# Patient Record
Sex: FEMALE | Race: WHITE | Employment: OTHER | URBAN - METROPOLITAN AREA
[De-identification: names, ages, dates, MRNs, and addresses within clinical notes are randomized per-mention and may not be internally consistent; named-entity substitution may affect disease eponyms.]

---

## 2017-10-19 ENCOUNTER — HOSPITAL ENCOUNTER (OUTPATIENT)
Dept: MAMMOGRAPHY | Age: 67
Discharge: HOME | End: 2017-10-19
Attending: INTERNAL MEDICINE | Admitting: INTERNAL MEDICINE

## 2017-10-19 DIAGNOSIS — Z12.31 VISIT FOR SCREENING MAMMOGRAM: ICD-10-CM

## 2018-10-27 ENCOUNTER — HOSPITAL ENCOUNTER (OUTPATIENT)
Dept: WOMENS IMAGING | Age: 68
Discharge: HOME | End: 2018-10-27
Payer: MEDICARE

## 2018-10-27 DIAGNOSIS — Z12.31 ENCOUNTER FOR SCREENING MAMMOGRAM FOR BREAST CANCER: ICD-10-CM

## 2018-10-27 PROCEDURE — 77063 BREAST TOMOSYNTHESIS BI: CPT

## 2019-10-28 ENCOUNTER — HOSPITAL ENCOUNTER (OUTPATIENT)
Dept: WOMENS IMAGING | Age: 69
Discharge: HOME | End: 2019-10-28
Payer: MEDICARE

## 2019-10-28 DIAGNOSIS — Z12.31 ENCOUNTER FOR SCREENING MAMMOGRAM FOR MALIGNANT NEOPLASM OF BREAST: ICD-10-CM

## 2019-10-28 PROCEDURE — 77063 BREAST TOMOSYNTHESIS BI: CPT

## 2020-11-02 ENCOUNTER — HOSPITAL ENCOUNTER (OUTPATIENT)
Dept: WOMENS IMAGING | Age: 70
Discharge: HOME | End: 2020-11-02
Payer: MEDICARE

## 2020-11-02 PROCEDURE — 77063 BREAST TOMOSYNTHESIS BI: CPT

## 2021-11-03 ENCOUNTER — HOSPITAL ENCOUNTER (OUTPATIENT)
Dept: WOMENS IMAGING | Age: 71
Discharge: HOME | End: 2021-11-03
Payer: MEDICARE

## 2021-11-03 DIAGNOSIS — Z12.31 ENCOUNTER FOR SCREENING MAMMOGRAM FOR MALIGNANT NEOPLASM OF BREAST: ICD-10-CM

## 2021-11-03 PROCEDURE — 77063 BREAST TOMOSYNTHESIS BI: CPT

## 2022-02-16 ENCOUNTER — APPOINTMENT (OUTPATIENT)
Dept: GENERAL RADIOLOGY | Age: 72
End: 2022-02-16
Payer: MEDICARE

## 2022-02-16 ENCOUNTER — HOSPITAL ENCOUNTER (EMERGENCY)
Age: 72
Discharge: HOME | End: 2022-02-16
Payer: MEDICARE

## 2022-02-16 VITALS
BODY MASS INDEX: 24.55 KG/M2 | RESPIRATION RATE: 16 BRPM | DIASTOLIC BLOOD PRESSURE: 87 MMHG | SYSTOLIC BLOOD PRESSURE: 144 MMHG | WEIGHT: 130 LBS | HEIGHT: 61 IN | HEART RATE: 86 BPM | TEMPERATURE: 97.8 F | OXYGEN SATURATION: 96 %
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
## 2022-02-16 DIAGNOSIS — S52.571A OTHER CLOSED INTRA-ARTICULAR FRACTURE OF DISTAL END OF RIGHT RADIUS, INITIAL ENCOUNTER: Primary | ICD-10-CM

## 2022-02-16 PROCEDURE — 29125 APPL SHORT ARM SPLINT STATIC: CPT

## 2022-02-16 PROCEDURE — 73100 X-RAY EXAM OF WRIST: CPT

## 2022-02-16 PROCEDURE — 73110 X-RAY EXAM OF WRIST: CPT

## 2022-02-16 PROCEDURE — 99283 EMERGENCY DEPT VISIT LOW MDM: CPT

## 2022-02-16 RX ORDER — PRAVASTATIN SODIUM 20 MG
20 TABLET ORAL NIGHTLY
COMMUNITY
Start: 2021-08-31

## 2022-02-16 RX ORDER — OXYCODONE HYDROCHLORIDE 5 MG/1
5 TABLET ORAL EVERY 8 HOURS PRN
Qty: 12 TABLET | Refills: 0 | Status: SHIPPED | OUTPATIENT
Start: 2022-02-16 | End: 2022-02-20

## 2022-02-16 ASSESSMENT — PAIN SCALES - GENERAL
PAINLEVEL_OUTOF10: 6
PAINLEVEL_OUTOF10: 5

## 2022-02-16 ASSESSMENT — PAIN DESCRIPTION - LOCATION: LOCATION: WRIST

## 2022-02-16 ASSESSMENT — PAIN DESCRIPTION - PAIN TYPE: TYPE: ACUTE PAIN

## 2022-02-16 ASSESSMENT — PAIN DESCRIPTION - ORIENTATION: ORIENTATION: RIGHT

## 2022-02-16 ASSESSMENT — PAIN - FUNCTIONAL ASSESSMENT: PAIN_FUNCTIONAL_ASSESSMENT: 0-10

## 2022-02-17 NOTE — ED NOTES
Applied an orthoglass ocl sugar tong splint to the patient's injured right wrist with the assistance of Miguelina. Applied a layer of stockinette and webrils underneath for added padding and patient comfort. Constructed splint using 27\" of 3\" orthoglass. Secured splint in a position of function using two 2\" ACE wraps and one 3\" ACE wrap. Checked CMS before and after application; remained intact. [facility]aced affected appendage [facility]. Patient understands all care instructions/directions and had no additional questions or concerns.      Jake Chance  02/16/22 [id number]

## 2022-02-17 NOTE — ED PROVIDER NOTES
Worried About [facility] in the Last Year: Not on file    Aidee of Food in the Last Year: Not on file   Transportation Needs:     Lack of Transportation (Medical): Not on file    Lack of Transportation (Non-Medical): Not on file   Physical Activity:     Days of Exercise per Week: Not on file    Minutes of Exercise per Session: Not on file   Stress:     Feeling of Stress : Not on file   Social Connections:     Frequency of Communication with Friends and Family: Not on file    Frequency of Social Gatherings with Friends and Family: Not on file    Attends Restorationist Services: Not on file    Active Member of [address] [place] [address] or Organizations: Not on file    Attends Club or Organization Meetings: Not on file    Marital Status: Not on file   Intimate Partner Violence:     Fear of Current or Ex-Partner: Not on file    Emotionally Abused: Not on file    Physically Abused: Not on file    Sexually Abused: Not on file   Housing Stability:     Unable to Pay for Housing in the Last Year: Not on file    Number of [place] in the Last Year: Not on file    Unstable Housing in the Last Year: Not on file     No current facility-administered medications for this encounter. Current Outpatient Medications   Medication Sig Dispense Refill    pravastatin (PRAVACHOL) 20 MG tablet Take 20 mg by mouth nightly      alendronate (FOSAMAX) 70 MG tablet Take 70 mg by mouth every 7 days      levothyroxine (SYNTHROID) 25 MCG tablet Take 25 mcg by mouth Daily      calcium citrate-vitamin D (CITRICAL + D) 315-250 MG-UNIT TABS per tablet Take 1 tablet by mouth 2 times daily (with meals)       No Known Allergies    REVIEW OF SYSTEMS  6 systems reviewed, pertinent positives per HPI otherwise noted to be negative    PHYSICAL EXAM  BP (!) 153/109   Pulse 85   Temp 97.8 °F (36.6 °C) (Oral)   Resp 18   Ht 5' 1\" (1.549 m)   Wt 130 lb (59 kg)   SpO2 95%   BMI 24.56 kg/m²   GENERAL APPEARANCE: Awake and alert. Cooperative. No acute distress. HEAD: Normocephalic. Atraumatic. EYES: PERRL. EOM's grossly intact. ENT: Mucous membranes are moist.   NECK: Supple. Normal ROM. CHEST: Equal symmetric chest rise. LUNGS: Breathing is unlabored. Speaking comfortably in full sentences. Abdomen: Nondistended  EXTREMITIES: MAEE. No acute deformities. Right wrist with obvious deformity. Radial pulses in tact and cap refills less than 2 seconds. Sensation is grossly intact. There is palpable step-off of distal radius. No crepitus noted. Significant swelling and bruising noted. She has full active range of motion of digits but very limited range of motion of wrist.  No elbow injury. SKIN: Warm and dry. NEUROLOGICAL: Alert and oriented. Strength is 5/5 in all extremities and sensation is intact. RADIOLOGY  XR WRIST RIGHT (2 VIEWS)    Result Date: 2/16/2022  EXAMINATION: 2 XRAY VIEWS OF THE RIGHT WRIST 2/16/2022 6:42 pm COMPARISON: None. HISTORY: ORDERING SYSTEM PROVIDED HISTORY: fall TECHNOLOGIST PROVIDED HISTORY: Reason for exam:->fall Reason for Exam: fall, wrist pain FINDINGS: Acute impacted volar angulated minimally displaced intra-articular fracture of the distal radial metaphysis. Ulnar styloid fracture of uncertain chronicity. Suboptimal evaluation for dislocation due to positioning. Moderate to large soft tissue edema. Acute distal radial fracture as described. ED COURSE  Patient was seen and evaluated in the emergency department for a wrist injury. There is an obvious deformity on exam, she is neurovascularly intact. Initial x-ray shows acute impacted volar angulated minimally displaced intra-articular fracture of distal radius metaphysis with ulnar styloid fracture. A hematoma block was performed by myself and the patient was placed in finger traps.   I consulted with orthopedics and spoke with Dr. Danielle Avelar who recommended follow-up in clinic early next week we did probable surgery tentatively on Thursday. I did attempt to manipulate the patient's wrist while a sugar tong splint was placed. We did obtain repeat x-ray which showed improved alignment. A discussion was had with the patient regarding imaging results and follow-up. She will call the orthopedic office tomorrow for close follow-up next week with possible plan for surgery on Thursday. We also discussed taking ibuprofen and Tylenol for pain on drive a short course of for breakthrough pain. Risk management discussed and shared decision making had with patient and/or surrogate. All questions were answered. Patient will follow up with Ortho for further evaluation/treatment. Patient will return to ED for new/worsening symptoms. MDM    I estimate there is LOW risk for COMPARTMENT SYNDROME, DEEP VENOUS THROMBOSIS, SEPTIC ARTHRITIS, TENDON OR NEUROVASCULAR INJURY, thus I consider the discharge disposition reasonable. Mali Encarnacion and I have discussed the diagnosis and risks, and we agree with discharging home to follow-up with their primary doctor or the referral orthopedist. We also discussed returning to the Emergency Department immediately if new or worsening symptoms occur. We have discussed the symptoms which are most concerning (e.g., changing or worsening pain, numbness, weakness) that necessitate immediate return. Final Impression    1. Other closed intra-articular fracture of distal end of right radius, initial encounter    2. Fall, initial encounter        Blood pressure (!) 144/87, pulse 86, temperature 97.8 °F (36.6 °C), temperature source Oral, resp. rate 16, height 5' 1\" (1.549 m), weight 130 lb (59 kg), SpO2 96 %. DISPOSITION  Patient was discharged to home in good condition.             Santos Barrientos, [address]  02/18/22 4900

## 2022-02-17 NOTE — ED NOTES
Pt scripts x1 instructed to follow up with [facility]. Assessed per [place] PA.      Felipe Cortés LPN  04/85/21 2456

## 2022-02-17 NOTE — ED NOTES
I called pt daughter in law made her aware pt is up for d/c home/will be out in car approx 10min.      Enrique Thompson LPN  29/32/59 3715

## 2022-02-18 ENCOUNTER — TELEPHONE (OUTPATIENT)
Dept: ORTHOPEDIC SURGERY | Age: 72
End: 2022-02-18

## 2022-02-18 ENCOUNTER — OFFICE VISIT (OUTPATIENT)
Dept: ORTHOPEDIC SURGERY | Age: 72
End: 2022-02-18
Payer: MEDICARE

## 2022-02-18 VITALS — HEIGHT: 61 IN | BODY MASS INDEX: 24.55 KG/M2 | WEIGHT: 130 LBS

## 2022-02-18 DIAGNOSIS — S52.501A CLOSED FRACTURE OF DISTAL END OF RIGHT RADIUS, UNSPECIFIED FRACTURE MORPHOLOGY, INITIAL ENCOUNTER: Primary | ICD-10-CM

## 2022-02-18 DIAGNOSIS — M25.531 RIGHT WRIST PAIN: ICD-10-CM

## 2022-02-18 PROCEDURE — 3017F COLORECTAL CA SCREEN DOC REV: CPT | Performed by: ORTHOPAEDIC SURGERY

## 2022-02-18 PROCEDURE — G8420 CALC BMI NORM PARAMETERS: HCPCS | Performed by: ORTHOPAEDIC SURGERY

## 2022-02-18 PROCEDURE — 1090F PRES/ABSN URINE INCON ASSESS: CPT | Performed by: ORTHOPAEDIC SURGERY

## 2022-02-18 PROCEDURE — 1123F ACP DISCUSS/DSCN MKR DOCD: CPT | Performed by: ORTHOPAEDIC SURGERY

## 2022-02-18 PROCEDURE — 99204 OFFICE O/P NEW MOD 45 MIN: CPT | Performed by: ORTHOPAEDIC SURGERY

## 2022-02-18 PROCEDURE — 1036F TOBACCO NON-USER: CPT | Performed by: ORTHOPAEDIC SURGERY

## 2022-02-18 PROCEDURE — G8400 PT W/DXA NO RESULTS DOC: HCPCS | Performed by: ORTHOPAEDIC SURGERY

## 2022-02-18 PROCEDURE — 4040F PNEUMOC VAC/ADMIN/RCVD: CPT | Performed by: ORTHOPAEDIC SURGERY

## 2022-02-18 PROCEDURE — G8427 DOCREV CUR MEDS BY ELIG CLIN: HCPCS | Performed by: ORTHOPAEDIC SURGERY

## 2022-02-18 PROCEDURE — G8484 FLU IMMUNIZE NO ADMIN: HCPCS | Performed by: ORTHOPAEDIC SURGERY

## 2022-02-18 NOTE — LETTER
[facility]  Surgery Precert & Billing Form:    DEMOGRAPHICS:                                                                                                       Patient Name:  Dallin Mccullough  Patient :  1950  Patient SS#:     Patient Phone: [phone] (home)  Alt.  Patient Phone:    Patient Address:  [address]  PCP:  Dee Whitley MD  Insurance: MEDICARE PART A AND B    DIAGNOSIS & PROCEDURE:                                                                                      Diagnosis: CLOSED FRACTURE OF RIGHT DISTAL RADIUS - S52.501A  Operation: right    SURGERY  INFORMATION  Date of Surgery:   22  Location:    [facility]  Type:    Outpatient  23 hour hold:  No  Surgeon:        Nic Cordoba MD          BILLING INFORMATION:                                                                                                Physician Procedure                                            CPT Codes          ORIF RIGHT DISTAL RADIUS FRACTURE- 86964                Precert information:    ***

## 2022-02-18 NOTE — H&P (VIEW-ONLY)
ORTHOPAEDIC SURGERY HISTORY AND PHYSICAL NOTE  Chief Complaint   Patient presents with    New Patient     R wrist       HISTORY OF PRESENT ILLNESS:  78-year-old right-hand-dominant female presents for evaluation of a right wrist injury. She sustained a mechanical fall on 216 2022. She indicates that she was walking her dog. Her dog pulled aggressively and she lost her balance. She fell onto an outstretched right hand. She sustained a displaced intra-articular dorsally angulated distal radius fracture. She presented emergency department at that time. A closed reduction was performed with the use of finger traps. She is placed into a splint. Since that time, she has continued to have pain and swelling to her wrist.  She denies dysesthesias or numbness. She did not have any open wounds. Her pain is currently rated a 6 out of 10. Its localized to the wrist and does not radiate. She denies any elbow pain. She denies other injuries. She denies syncope. Past Medical History:   Diagnosis Date    Thyroid disease        Current Outpatient Medications   Medication Sig Dispense Refill    pravastatin (PRAVACHOL) 20 MG tablet Take 20 mg by mouth nightly      oxyCODONE (ROXICODONE) 5 MG immediate release tablet Take 1 tablet by mouth every 8 hours as needed for Pain for up to 4 days. Intended supply: 3 days. Take lowest dose possible to manage pain 12 tablet 0    alendronate (FOSAMAX) 70 MG tablet Take 70 mg by mouth every 7 days      levothyroxine (SYNTHROID) 25 MCG tablet Take 25 mcg by mouth Daily      calcium citrate-vitamin D (CITRICAL + D) 315-250 MG-UNIT TABS per tablet Take 1 tablet by mouth 2 times daily (with meals)       No current facility-administered medications for this visit.         Past Surgical History:   Procedure Laterality Date    ABDOMEN SURGERY  1995    benign tmor    COLONOSCOPY  12-    CSF SHUNT  1985    arnold chiari malformation - left ear       No Known Allergies    Family History   Problem Relation Age of Onset    Heart Disease Father     Breast Cancer Sister 46       Social History     Socioeconomic History    Marital status:      Spouse name: Not on file    Number of children: Not on file    Years of education: Not on file    Highest education level: Not on file   Occupational History    Not on file   Tobacco Use    Smoking status: Never Smoker    Smokeless tobacco: Never Used   Substance and Sexual Activity    Alcohol use: No    Drug use: Not on file    Sexual activity: Not on file   Other Topics Concern    Not on file   Social History Narrative    Not on file     Social Determinants of Health     Financial Resource Strain:     Difficulty of Paying Living Expenses: Not on file   Food Insecurity:     Worried About Running Out of Food in the Last Year: Not on file    Aidee of Food in the Last Year: Not on file   Transportation Needs:     Lack of Transportation (Medical): Not on file    Lack of Transportation (Non-Medical):  Not on file   Physical Activity:     Days of Exercise per Week: Not on file    Minutes of Exercise per Session: Not on file   Stress:     Feeling of Stress : Not on file   Social Connections:     Frequency of Communication with Friends and Family: Not on file    Frequency of Social Gatherings with Friends and Family: Not on file    Attends Taoist Services: Not on file    Active Member of [address] [place] [address] [employer] or Organizations: Not on file    Attends Club or Organization Meetings: Not on file    Marital Status: Not on file   Intimate Partner Violence:     Fear of Current or Ex-Partner: Not on file    Emotionally Abused: Not on file    Physically Abused: Not on file    Sexually Abused: Not on file   Housing Stability:     Unable to Pay for Housing in the Last Year: Not on file    Number of [place] in the Last Year: Not on file    Unstable Housing in the Last Year: Not on file     Review of Systems: Please see today's intake form for complete review of systems    PHYSICAL EXAM  Gen: awake, alert, oriented  HEENT: atraumatic, normocephalic  Neck: supple  Resp: equal chest rise bilaterally, no audible wheezes, no accessory muscle use. CV: Lower extremities warm and well perfused. Abd: soft, nontender   Focused examination of the right upper extremity:  Patient presents in a well-padded sugar tong splint. This was left in place. There is no obvious deformity. Sensation is intact to light touch in median, radial, ulnar, and axillary distributions. Motor function is intact to AIN, PIN, ulnar motor nerves. There is a strong palpable radial pulse, and brisk capillary refill to the fingers. Compartments are soft and compressible    RADIOGRAPHIC EXAM:  2 views of the right wrist including PA and lateral x-rays obtained in the emergency department demonstrate approximately 35 degrees dorsal angulation And loss of radial height. There is an associated ulnar styloid fracture. There is dorsal comminution. No other fractures are seen. Postreduction x-rays in the emergency department demonstrate persistent dorsal angulation and minor loss of radial height. There is overall improvement in alignment. There is intra-articular fracture extension into the dorsal lunate facet. There is minor radial translation in relation to the radial shaft. Repeat x-rays in clinic today including 3 views, PA, oblique, and lateral redemonstrate intra-articular distal radius fracture with persistent dorsal angulation. There is minor loss of radial height. There is an associated ulnar styloid fracture. There is dorsal comminution. ASSESSEMENT AND PLAN    80 y.o. female with the following orthopaedic surgery problems:    1. Right displaced intra-articular distal radius fracture    Patient has numerous predictors of failure of nonoperative management.   She has an age greater than 61, associated ulnar styloid fracture, initial dorsal angulation greater than 30 degrees, and dorsal comminution. I recommended operative intervention in the form of volar locked plating. I discussed the procedure in detail including risks, benefits, and alternatives. We described a standard postoperative course. She expressed understanding of this. She will undergo a preoperative work-up including EKG and labs. I recommended that she see her primary care physician for history of physical.  Would tentatively plan ORIF Thursday, 2/24/2022 at [facility] ambulatory surgery center on an outpatient basis. She will undergo general anesthesia with an upper extremity block for postoperative pain control.     Kike Flores MD

## 2022-02-18 NOTE — TELEPHONE ENCOUNTER
Auth: NPR  Date: 02/24/22  Type of SX: OUTPATIENT  Location: [facility]  CPT: 02116   DX: S52.501A  SX area: R DISTAL RADIUS  Insurance: MEDICARE A&B

## 2022-02-18 NOTE — H&P
ORTHOPAEDIC SURGERY HISTORY AND PHYSICAL NOTE  Chief Complaint   Patient presents with    New Patient     R wrist       HISTORY OF PRESENT ILLNESS:  29-year-old right-hand-dominant female presents for evaluation of a right wrist injury. She sustained a mechanical fall on 216 2022. She indicates that she was walking her dog. Her dog pulled aggressively and she lost her balance. She fell onto an outstretched right hand. She sustained a displaced intra-articular dorsally angulated distal radius fracture. She presented emergency department at that time. A closed reduction was performed with the use of finger traps. She is placed into a splint. Since that time, she has continued to have pain and swelling to her wrist.  She denies dysesthesias or numbness. She did not have any open wounds. Her pain is currently rated a 6 out of 10. Its localized to the wrist and does not radiate. She denies any elbow pain. She denies other injuries. She denies syncope. Past Medical History:   Diagnosis Date    Thyroid disease        Current Outpatient Medications   Medication Sig Dispense Refill    pravastatin (PRAVACHOL) 20 MG tablet Take 20 mg by mouth nightly      oxyCODONE (ROXICODONE) 5 MG immediate release tablet Take 1 tablet by mouth every 8 hours as needed for Pain for up to 4 days. Intended supply: 3 days. Take lowest dose possible to manage pain 12 tablet 0    alendronate (FOSAMAX) 70 MG tablet Take 70 mg by mouth every 7 days      levothyroxine (SYNTHROID) 25 MCG tablet Take 25 mcg by mouth Daily      calcium citrate-vitamin D (CITRICAL + D) 315-250 MG-UNIT TABS per tablet Take 1 tablet by mouth 2 times daily (with meals)       No current facility-administered medications for this visit.         Past Surgical History:   Procedure Laterality Date    ABDOMEN SURGERY  1995    benign tmor    COLONOSCOPY  12-    CSF SHUNT  1985    arnold chiari malformation - left ear       No Known Allergies    Family History   Problem Relation Age of Onset    Heart Disease Father     Breast Cancer Sister 46       Social History     Socioeconomic History    Marital status:      Spouse name: Not on file    Number of children: Not on file    Years of education: Not on file    Highest education level: Not on file   Occupational History    Not on file   Tobacco Use    Smoking status: Never Smoker    Smokeless tobacco: Never Used   Substance and Sexual Activity    Alcohol use: No    Drug use: Not on file    Sexual activity: Not on file   Other Topics Concern    Not on file   Social History Narrative    Not on file     Social Determinants of Health     Financial Resource Strain:     Difficulty of Paying Living Expenses: Not on file   Food Insecurity:     Worried About Running Out of Food in the Last Year: Not on file    Aidee of Food in the Last Year: Not on file   Transportation Needs:     Lack of Transportation (Medical): Not on file    Lack of Transportation (Non-Medical):  Not on file   Physical Activity:     Days of Exercise per Week: Not on file    Minutes of Exercise per Session: Not on file   Stress:     Feeling of Stress : Not on file   Social Connections:     Frequency of Communication with Friends and Family: Not on file    Frequency of Social Gatherings with Friends and Family: Not on file    Attends Quaker Services: Not on file    Active Member of [address] [place] [address] [employer] or Organizations: Not on file    Attends Club or Organization Meetings: Not on file    Marital Status: Not on file   Intimate Partner Violence:     Fear of Current or Ex-Partner: Not on file    Emotionally Abused: Not on file    Physically Abused: Not on file    Sexually Abused: Not on file   Housing Stability:     Unable to Pay for Housing in the Last Year: Not on file    Number of [place] in the Last Year: Not on file    Unstable Housing in the Last Year: Not on file     Review of Systems: Please see today's intake form for complete review of systems    PHYSICAL EXAM  Gen: awake, alert, oriented  HEENT: atraumatic, normocephalic  Neck: supple  Resp: equal chest rise bilaterally, no audible wheezes, no accessory muscle use. CV: Lower extremities warm and well perfused. Abd: soft, nontender   Focused examination of the right upper extremity:  Patient presents in a well-padded sugar tong splint. This was left in place. There is no obvious deformity. Sensation is intact to light touch in median, radial, ulnar, and axillary distributions. Motor function is intact to AIN, PIN, ulnar motor nerves. There is a strong palpable radial pulse, and brisk capillary refill to the fingers. Compartments are soft and compressible    RADIOGRAPHIC EXAM:  2 views of the right wrist including PA and lateral x-rays obtained in the emergency department demonstrate approximately 35 degrees dorsal angulation And loss of radial height. There is an associated ulnar styloid fracture. There is dorsal comminution. No other fractures are seen. Postreduction x-rays in the emergency department demonstrate persistent dorsal angulation and minor loss of radial height. There is overall improvement in alignment. There is intra-articular fracture extension into the dorsal lunate facet. There is minor radial translation in relation to the radial shaft. Repeat x-rays in clinic today including 3 views, PA, oblique, and lateral redemonstrate intra-articular distal radius fracture with persistent dorsal angulation. There is minor loss of radial height. There is an associated ulnar styloid fracture. There is dorsal comminution. ASSESSEMENT AND PLAN    80 y.o. female with the following orthopaedic surgery problems:    1. Right displaced intra-articular distal radius fracture    Patient has numerous predictors of failure of nonoperative management.   She has an age greater than 61, associated ulnar styloid fracture, initial dorsal angulation greater than 30 degrees, and dorsal comminution. I recommended operative intervention in the form of volar locked plating. I discussed the procedure in detail including risks, benefits, and alternatives. We described a standard postoperative course. She expressed understanding of this. She will undergo a preoperative work-up including EKG and labs. I recommended that she see her primary care physician for history of physical.  Would tentatively plan ORIF Thursday, 2/24/2022 at [facility] ambulatory surgery center on an outpatient basis. She will undergo general anesthesia with an upper extremity block for postoperative pain control.     Jessica Browning MD

## 2022-02-18 NOTE — LETTER
[facility]      Patient Name:  Jass Forbes  Patient :  1950     Patient SS#:      Patient Phone:  [phone] (home)     Patient Address:  [address] [place] [address] [place]    PCP:  Jignesh Simon MD       Surgeon: John Cavanaugh DO      Surgical Procedure: ORIF RIGHT DISTAL RADIUS FRACTURE- 66884    Scheduled Date: 22      Scheduled Time: 9:30AM    Length of Surgery: 1 HOUR    Diagnosis: CLOSED FRACTURE OF RIGHT DISTAL RADIUS- S52.501A                 Classification: [x] Outpatient       [] Same Day Admission [] In-Patient [] Day Admit    Cardiac Clearance Needed:  []YES          [x]NO     Anesthesia Type:   [x]General W/BLOCK []MAC    []IV Regional []Local []Other:                 SCD:  [x]Knee High []Thigh High      Allergies: No Known Allergies  Latex: []YES          [x]NO    [x]C-ARM needed - MINI []Special Equipment:  []Rep Notified     If no pre-admission testing is needed, specify reason (i.e. Testing at primary; no testing needed; etc)    Special Requests:     Insurance:  Payor: [employer] / Plan: MEDICARE PART A AND B / Product Type: *No Product type* /     Scheduled By: Candida Hunter MA 2022                                                            2

## 2022-02-21 RX ORDER — OXYCODONE HYDROCHLORIDE 5 MG/1
5 CAPSULE ORAL PRN
Status: ON HOLD | COMMUNITY
End: 2022-02-24 | Stop reason: SDUPTHER

## 2022-02-21 RX ORDER — DORZOLAMIDE HYDROCHLORIDE AND TIMOLOL MALEATE 20; 5 MG/ML; MG/ML
1 SOLUTION/ DROPS OPHTHALMIC 2 TIMES DAILY
COMMUNITY

## 2022-02-21 NOTE — PROGRESS NOTES
Natalee January    Age 80 y.o.    female    1950    N [id number]    2/24/2022  Arrival Time_____________  OR Time____________[address]     Procedure(s):  OPEN REDUCTION INTERNAL FIXATION RIGHT DISTAL RADIUS FRACTURE WITH BLOCK FOR PAIN CONTROL                      General    Surgeon(s):  Garret Rodriguez, MD       Phone [phone] ([place])     [address]  Cell         Work  _____________________________________________________________________  _____________________________________________________________________  _____________________________________________________________________  _____________________________________________________________________  _____________________________________________________________________    PCP _____________________________ Phone_________________     H&P__________________Bringing      Chart            Epic   DOS      Called________  EKG__________________Bringing      Chart            Epic   DOS      Called________  LAB__________________ Bringing      Chart            Epic   DOS      Called________  Cardiac Clearance_______Bringing      Chart            Epic      DOS      Called________    Cardiologist________________________ Phone___________________________    ? Mandaeism concerns / Waiver on Chart            PAT Communications________________  ? Pre-op Instructions Given [place]          _________________________________  ? Directions to Surgery Center                          _________________________________  ? Transportation Home_______________      __________________________________  ?  Crutches/Walker__________________        __________________________________    ________Pre-op Orders   _______Transcribed    _______Wt.  ________Pharmacy          _______SCD  ______VTE     ______TED Lidia Lot  _______  Surgery Consent    _______  Anesthesia Consent         COVID DATE______________LOCATION________________ RESULT__________

## 2022-02-22 ENCOUNTER — ANESTHESIA EVENT (OUTPATIENT)
Dept: OPERATING ROOM | Age: 72
End: 2022-02-22
Payer: MEDICARE

## 2022-02-24 ENCOUNTER — ANESTHESIA (OUTPATIENT)
Dept: OPERATING ROOM | Age: 72
End: 2022-02-24
Payer: MEDICARE

## 2022-02-24 ENCOUNTER — APPOINTMENT (OUTPATIENT)
Dept: GENERAL RADIOLOGY | Age: 72
End: 2022-02-24
Attending: ORTHOPAEDIC SURGERY
Payer: MEDICARE

## 2022-02-24 ENCOUNTER — HOSPITAL ENCOUNTER (OUTPATIENT)
Age: 72
Setting detail: OUTPATIENT SURGERY
Discharge: HOME | End: 2022-02-24
Attending: ORTHOPAEDIC SURGERY | Admitting: ORTHOPAEDIC SURGERY
Payer: MEDICARE

## 2022-02-24 VITALS
RESPIRATION RATE: 18 BRPM | SYSTOLIC BLOOD PRESSURE: 110 MMHG | HEART RATE: 58 BPM | HEIGHT: 61 IN | TEMPERATURE: 97.6 F | DIASTOLIC BLOOD PRESSURE: 95 MMHG | WEIGHT: 130 LBS | OXYGEN SATURATION: 95 % | BODY MASS INDEX: 24.55 KG/M2

## 2022-02-24 VITALS
OXYGEN SATURATION: 93 % | SYSTOLIC BLOOD PRESSURE: 92 MMHG | DIASTOLIC BLOOD PRESSURE: 54 MMHG | RESPIRATION RATE: 4 BRPM

## 2022-02-24 DIAGNOSIS — S52.501D CLOSED FRACTURE OF DISTAL END OF RIGHT RADIUS WITH ROUTINE HEALING, UNSPECIFIED FRACTURE MORPHOLOGY, SUBSEQUENT ENCOUNTER: Primary | ICD-10-CM

## 2022-02-24 PROCEDURE — 2580000003 HC RX 258: Performed by: ORTHOPAEDIC SURGERY

## 2022-02-24 PROCEDURE — 2709999900 HC NON-CHARGEABLE SUPPLY: Performed by: ORTHOPAEDIC SURGERY

## 2022-02-24 PROCEDURE — 2580000003 HC RX 258: Performed by: ANESTHESIOLOGY

## 2022-02-24 PROCEDURE — 3600000014 HC SURGERY LEVEL 4 ADDTL 15MIN: Performed by: ORTHOPAEDIC SURGERY

## 2022-02-24 PROCEDURE — A4217 STERILE WATER/SALINE, 500 ML: HCPCS | Performed by: ORTHOPAEDIC SURGERY

## 2022-02-24 PROCEDURE — 6360000002 HC RX W HCPCS: Performed by: ORTHOPAEDIC SURGERY

## 2022-02-24 PROCEDURE — 2720000010 HC SURG SUPPLY STERILE: Performed by: ORTHOPAEDIC SURGERY

## 2022-02-24 PROCEDURE — 6360000002 HC RX W HCPCS: Performed by: NURSE ANESTHETIST, CERTIFIED REGISTERED

## 2022-02-24 PROCEDURE — 3700000001 HC ADD 15 MINUTES (ANESTHESIA): Performed by: ORTHOPAEDIC SURGERY

## 2022-02-24 PROCEDURE — 6360000002 HC RX W HCPCS: Performed by: ANESTHESIOLOGY

## 2022-02-24 PROCEDURE — 3700000000 HC ANESTHESIA ATTENDED CARE: Performed by: ORTHOPAEDIC SURGERY

## 2022-02-24 PROCEDURE — 7100000000 HC PACU RECOVERY - FIRST 15 MIN: Performed by: ORTHOPAEDIC SURGERY

## 2022-02-24 PROCEDURE — 25607 OPTX DST RD XARTC FX/EPI SEP: CPT | Performed by: ORTHOPAEDIC SURGERY

## 2022-02-24 PROCEDURE — 7100000001 HC PACU RECOVERY - ADDTL 15 MIN: Performed by: ORTHOPAEDIC SURGERY

## 2022-02-24 PROCEDURE — 73110 X-RAY EXAM OF WRIST: CPT

## 2022-02-24 PROCEDURE — 3209999900 FLUORO FOR SURGICAL PROCEDURES

## 2022-02-24 PROCEDURE — 7100000011 HC PHASE II RECOVERY - ADDTL 15 MIN: Performed by: ORTHOPAEDIC SURGERY

## 2022-02-24 PROCEDURE — 64415 NJX AA&/STRD BRCH PLXS IMG: CPT | Performed by: ANESTHESIOLOGY

## 2022-02-24 PROCEDURE — 7100000010 HC PHASE II RECOVERY - FIRST 15 MIN: Performed by: ORTHOPAEDIC SURGERY

## 2022-02-24 PROCEDURE — 3600000004 HC SURGERY LEVEL 4 BASE: Performed by: ORTHOPAEDIC SURGERY

## 2022-02-24 PROCEDURE — 2500000003 HC RX 250 WO HCPCS: Performed by: NURSE ANESTHETIST, CERTIFIED REGISTERED

## 2022-02-24 PROCEDURE — C1713 ANCHOR/SCREW BN/BN,TIS/BN: HCPCS | Performed by: ORTHOPAEDIC SURGERY

## 2022-02-24 DEVICE — EVOS 2.4MM X 18MM LOCKING SCREW T7 SELF-TAPPING
Type: IMPLANTABLE DEVICE | Site: RADIUS | Status: FUNCTIONAL
Brand: EVOS

## 2022-02-24 DEVICE — EVOS 2.4MM X 14MM LOCKING SCREW T7 SELF-TAPPING
Type: IMPLANTABLE DEVICE | Site: RADIUS | Status: FUNCTIONAL
Brand: EVOS

## 2022-02-24 DEVICE — EVOS 2.4MM X 13MM CORTEX SCREW T7 SELF-TAPPING
Type: IMPLANTABLE DEVICE | Site: RADIUS | Status: FUNCTIONAL
Brand: EVOS

## 2022-02-24 DEVICE — EVOS DISTAL RADIUS VOLAR PLATE 3                                    HOLE RIGHT STANDARD 48MM
Type: IMPLANTABLE DEVICE | Site: RADIUS | Status: FUNCTIONAL
Brand: EVOS

## 2022-02-24 DEVICE — EVOS 2.4MM X 14MM CORTEX SCREW T7 SELF-TAPPING
Type: IMPLANTABLE DEVICE | Site: RADIUS | Status: FUNCTIONAL
Brand: EVOS

## 2022-02-24 DEVICE — EVOS 2.4MM X 20MM LOCKING SCREW T7 SELF-TAPPING
Type: IMPLANTABLE DEVICE | Site: RADIUS | Status: FUNCTIONAL
Brand: EVOS

## 2022-02-24 RX ORDER — OXYCODONE HYDROCHLORIDE 5 MG/1
5 CAPSULE ORAL EVERY 4 HOURS PRN
Qty: 30 CAPSULE | Refills: 0 | Status: SHIPPED | OUTPATIENT
Start: 2022-02-24 | End: 2022-03-01

## 2022-02-24 RX ORDER — DIPHENHYDRAMINE HYDROCHLORIDE 50 MG/ML
12.5 INJECTION INTRAMUSCULAR; INTRAVENOUS
Status: DISCONTINUED | OUTPATIENT
Start: 2022-02-24 | End: 2022-02-24 | Stop reason: HOSPADM

## 2022-02-24 RX ORDER — SODIUM CHLORIDE 0.9 % (FLUSH) 0.9 %
10 SYRINGE (ML) INJECTION PRN
Status: DISCONTINUED | OUTPATIENT
Start: 2022-02-24 | End: 2022-02-24 | Stop reason: HOSPADM

## 2022-02-24 RX ORDER — OXYCODONE HYDROCHLORIDE 5 MG/1
10 TABLET ORAL PRN
Status: DISCONTINUED | OUTPATIENT
Start: 2022-02-24 | End: 2022-02-24 | Stop reason: HOSPADM

## 2022-02-24 RX ORDER — SODIUM CHLORIDE 0.9 % (FLUSH) 0.9 %
5-40 SYRINGE (ML) INJECTION PRN
Status: DISCONTINUED | OUTPATIENT
Start: 2022-02-24 | End: 2022-02-24 | Stop reason: HOSPADM

## 2022-02-24 RX ORDER — MEPERIDINE HYDROCHLORIDE 50 MG/ML
12.5 INJECTION INTRAMUSCULAR; INTRAVENOUS; SUBCUTANEOUS EVERY 5 MIN PRN
Status: DISCONTINUED | OUTPATIENT
Start: 2022-02-24 | End: 2022-02-24 | Stop reason: HOSPADM

## 2022-02-24 RX ORDER — FENTANYL CITRATE 50 UG/ML
INJECTION, SOLUTION INTRAMUSCULAR; INTRAVENOUS
Status: COMPLETED
Start: 2022-02-24 | End: 2022-02-24

## 2022-02-24 RX ORDER — EPHEDRINE SULFATE 50 MG/ML
INJECTION INTRAVENOUS PRN
Status: DISCONTINUED | OUTPATIENT
Start: 2022-02-24 | End: 2022-02-24 | Stop reason: SDUPTHER

## 2022-02-24 RX ORDER — MIDAZOLAM HYDROCHLORIDE 1 MG/ML
INJECTION INTRAMUSCULAR; INTRAVENOUS
Status: COMPLETED
Start: 2022-02-24 | End: 2022-02-24

## 2022-02-24 RX ORDER — SODIUM CHLORIDE 9 MG/ML
25 INJECTION, SOLUTION INTRAVENOUS PRN
Status: DISCONTINUED | OUTPATIENT
Start: 2022-02-24 | End: 2022-02-24 | Stop reason: HOSPADM

## 2022-02-24 RX ORDER — SODIUM CHLORIDE, SODIUM LACTATE, POTASSIUM CHLORIDE, CALCIUM CHLORIDE 600; 310; 30; 20 MG/100ML; MG/100ML; MG/100ML; MG/100ML
INJECTION, SOLUTION INTRAVENOUS CONTINUOUS
Status: DISCONTINUED | OUTPATIENT
Start: 2022-02-24 | End: 2022-02-24 | Stop reason: HOSPADM

## 2022-02-24 RX ORDER — PROPOFOL 10 MG/ML
INJECTION, EMULSION INTRAVENOUS PRN
Status: DISCONTINUED | OUTPATIENT
Start: 2022-02-24 | End: 2022-02-24 | Stop reason: SDUPTHER

## 2022-02-24 RX ORDER — SODIUM CHLORIDE 0.9 % (FLUSH) 0.9 %
10 SYRINGE (ML) INJECTION EVERY 12 HOURS SCHEDULED
Status: DISCONTINUED | OUTPATIENT
Start: 2022-02-24 | End: 2022-02-24 | Stop reason: HOSPADM

## 2022-02-24 RX ORDER — PHENYLEPHRINE HCL IN 0.9% NACL 1 MG/10 ML
SYRINGE (ML) INTRAVENOUS PRN
Status: DISCONTINUED | OUTPATIENT
Start: 2022-02-24 | End: 2022-02-24 | Stop reason: SDUPTHER

## 2022-02-24 RX ORDER — LABETALOL HYDROCHLORIDE 5 MG/ML
5 INJECTION, SOLUTION INTRAVENOUS EVERY 10 MIN PRN
Status: DISCONTINUED | OUTPATIENT
Start: 2022-02-24 | End: 2022-02-24 | Stop reason: HOSPADM

## 2022-02-24 RX ORDER — DEXAMETHASONE SODIUM PHOSPHATE 10 MG/ML
INJECTION INTRAMUSCULAR; INTRAVENOUS PRN
Status: DISCONTINUED | OUTPATIENT
Start: 2022-02-24 | End: 2022-02-24 | Stop reason: SDUPTHER

## 2022-02-24 RX ORDER — SODIUM CHLORIDE 0.9 % (FLUSH) 0.9 %
5-40 SYRINGE (ML) INJECTION EVERY 12 HOURS SCHEDULED
Status: DISCONTINUED | OUTPATIENT
Start: 2022-02-24 | End: 2022-02-24 | Stop reason: HOSPADM

## 2022-02-24 RX ORDER — ONDANSETRON 2 MG/ML
4 INJECTION INTRAMUSCULAR; INTRAVENOUS
Status: DISCONTINUED | OUTPATIENT
Start: 2022-02-24 | End: 2022-02-24 | Stop reason: HOSPADM

## 2022-02-24 RX ORDER — LIDOCAINE HYDROCHLORIDE 20 MG/ML
INJECTION, SOLUTION EPIDURAL; INFILTRATION; INTRACAUDAL; PERINEURAL PRN
Status: DISCONTINUED | OUTPATIENT
Start: 2022-02-24 | End: 2022-02-24 | Stop reason: SDUPTHER

## 2022-02-24 RX ORDER — ONDANSETRON 2 MG/ML
INJECTION INTRAMUSCULAR; INTRAVENOUS PRN
Status: DISCONTINUED | OUTPATIENT
Start: 2022-02-24 | End: 2022-02-24 | Stop reason: SDUPTHER

## 2022-02-24 RX ORDER — OXYCODONE HYDROCHLORIDE 5 MG/1
5 TABLET ORAL PRN
Status: DISCONTINUED | OUTPATIENT
Start: 2022-02-24 | End: 2022-02-24 | Stop reason: HOSPADM

## 2022-02-24 RX ORDER — LIDOCAINE HYDROCHLORIDE 10 MG/ML
1 INJECTION, SOLUTION EPIDURAL; INFILTRATION; INTRACAUDAL; PERINEURAL
Status: DISCONTINUED | OUTPATIENT
Start: 2022-02-24 | End: 2022-02-24 | Stop reason: HOSPADM

## 2022-02-24 RX ORDER — MIDAZOLAM HYDROCHLORIDE 1 MG/ML
INJECTION INTRAMUSCULAR; INTRAVENOUS PRN
Status: DISCONTINUED | OUTPATIENT
Start: 2022-02-24 | End: 2022-02-24 | Stop reason: SDUPTHER

## 2022-02-24 RX ORDER — FENTANYL CITRATE 50 UG/ML
INJECTION, SOLUTION INTRAMUSCULAR; INTRAVENOUS PRN
Status: DISCONTINUED | OUTPATIENT
Start: 2022-02-24 | End: 2022-02-24 | Stop reason: SDUPTHER

## 2022-02-24 RX ORDER — MAGNESIUM HYDROXIDE 1200 MG/15ML
LIQUID ORAL CONTINUOUS PRN
Status: COMPLETED | OUTPATIENT
Start: 2022-02-24 | End: 2022-02-24

## 2022-02-24 RX ADMIN — ONDANSETRON 4 MG: 2 INJECTION INTRAMUSCULAR; INTRAVENOUS at 09:48

## 2022-02-24 RX ADMIN — FENTANYL CITRATE 100 MCG: 50 INJECTION INTRAMUSCULAR; INTRAVENOUS at 09:08

## 2022-02-24 RX ADMIN — DEXAMETHASONE SODIUM PHOSPHATE 6 MG: 10 INJECTION INTRAMUSCULAR; INTRAVENOUS at 09:48

## 2022-02-24 RX ADMIN — EPHEDRINE SULFATE 10 MG: 50 INJECTION INTRAVENOUS at 10:01

## 2022-02-24 RX ADMIN — SODIUM CHLORIDE, POTASSIUM CHLORIDE, SODIUM LACTATE AND CALCIUM CHLORIDE: 600; 310; 30; 20 INJECTION, SOLUTION INTRAVENOUS at 08:15

## 2022-02-24 RX ADMIN — MIDAZOLAM HYDROCHLORIDE 2 MG: 2 INJECTION, SOLUTION INTRAMUSCULAR; INTRAVENOUS at 09:08

## 2022-02-24 RX ADMIN — EPHEDRINE SULFATE 10 MG: 50 INJECTION INTRAVENOUS at 10:19

## 2022-02-24 RX ADMIN — EPHEDRINE SULFATE 10 MG: 50 INJECTION INTRAVENOUS at 10:40

## 2022-02-24 RX ADMIN — Medication 100 MCG: at 09:55

## 2022-02-24 RX ADMIN — LIDOCAINE HYDROCHLORIDE 60 MG: 20 INJECTION, SOLUTION EPIDURAL; INFILTRATION; INTRACAUDAL; PERINEURAL at 09:37

## 2022-02-24 RX ADMIN — PROPOFOL 150 MG: 10 INJECTION, EMULSION INTRAVENOUS at 09:33

## 2022-02-24 RX ADMIN — SODIUM CHLORIDE, POTASSIUM CHLORIDE, SODIUM LACTATE AND CALCIUM CHLORIDE: 600; 310; 30; 20 INJECTION, SOLUTION INTRAVENOUS at 10:34

## 2022-02-24 RX ADMIN — CEFAZOLIN SODIUM 2000 MG: 10 INJECTION, POWDER, FOR SOLUTION INTRAVENOUS at 09:33

## 2022-02-24 RX ADMIN — Medication 100 MCG: at 09:46

## 2022-02-24 ASSESSMENT — PULMONARY FUNCTION TESTS
PIF_VALUE: 3
PIF_VALUE: 16
PIF_VALUE: 3
PIF_VALUE: 2
PIF_VALUE: 3
PIF_VALUE: 12
PIF_VALUE: 3
PIF_VALUE: 3
PIF_VALUE: 1
PIF_VALUE: 3
PIF_VALUE: 0
PIF_VALUE: 3
PIF_VALUE: 15
PIF_VALUE: 3
PIF_VALUE: 13
PIF_VALUE: 2
PIF_VALUE: 3
PIF_VALUE: 9
PIF_VALUE: 3
PIF_VALUE: 4
PIF_VALUE: 2
PIF_VALUE: 17
PIF_VALUE: 3
PIF_VALUE: 0
PIF_VALUE: 3
PIF_VALUE: 1
PIF_VALUE: 3
PIF_VALUE: 15
PIF_VALUE: 3
PIF_VALUE: 1
PIF_VALUE: 3
PIF_VALUE: 3
PIF_VALUE: 17
PIF_VALUE: 3
PIF_VALUE: 23
PIF_VALUE: 3
PIF_VALUE: 16
PIF_VALUE: 3

## 2022-02-24 ASSESSMENT — PAIN SCALES - GENERAL
PAINLEVEL_OUTOF10: 0

## 2022-02-24 ASSESSMENT — PAIN - FUNCTIONAL ASSESSMENT: PAIN_FUNCTIONAL_ASSESSMENT: 0-10

## 2022-02-24 NOTE — ANESTHESIA PRE PROCEDURE
Department of Anesthesiology  Preprocedure Note       Name:  Shawn Santos   Age:  80 y.o.  :  1950                                          MRN:  [id number]         Date:  2022      Surgeon: Luh Kaufman):  Gaby Mack MD    Procedure: Procedure(s):  OPEN REDUCTION INTERNAL FIXATION RIGHT DISTAL RADIUS FRACTURE WITH BLOCK FOR PAIN CONTROL    Medications prior to admission:   Prior to Admission medications    Medication Sig Start Date End Date Taking? Authorizing Provider   Bimatoprost (LUMIGAN OP) Apply to eye at bedtime Both eyes   Yes Historical Provider, MD   dorzolamide-timolol (COSOPT) 22.3-6.8 MG/ML ophthalmic solution Place 1 drop into both eyes 2 times daily   Yes Historical Provider, MD   oxyCODONE 5 MG capsule Take 5 mg by mouth as needed for Pain.    Yes Historical Provider, MD   pravastatin (PRAVACHOL) 20 MG tablet Take 20 mg by mouth nightly 21  Yes Historical Provider, MD   alendronate (FOSAMAX) 70 MG tablet Take 70 mg by mouth every 7 days   Yes Historical Provider, MD   levothyroxine (SYNTHROID) 25 MCG tablet Take 25 mcg by mouth Daily   Yes Historical Provider, MD   calcium citrate-vitamin D (CITRICAL + D) 315-250 MG-UNIT TABS per tablet Take 1 tablet by mouth once a week    Yes Historical Provider, MD       Current medications:    Current Facility-Administered Medications   Medication Dose Route Frequency Provider Last Rate Last Admin    sodium chloride flush 0.9 % injection 5-40 mL  5-40 mL IntraVENous 2 times per day Gaby Mack MD        sodium chloride flush 0.9 % injection 5-40 mL  5-40 mL IntraVENous PRN Gaby Mack MD        0.9 % sodium chloride infusion  25 mL IntraVENous PRN Gaby Mack MD        ceFAZolin (ANCEF) 2000 mg in dextrose 5 % 100 mL IVPB  2,000 mg IntraVENous On Call to [address] [place] [address], MD        lidocaine PF 1 % injection 1 mL  1 mL IntraDERmal Once PRN Alverto Quinones MD        lactated ringers infusion   IntraVENous Continuous MD Yanet Beck sodium chloride flush 0.9 % injection 10 mL  10 mL IntraVENous 2 times per day Lurdes Winter MD        sodium chloride flush 0.9 % injection 10 mL  10 mL IntraVENous PRN Lurdes Winter MD        0.9 % sodium chloride infusion  25 mL IntraVENous PRN Lurdes Winter MD           Allergies:  No Known Allergies    Problem List:    Patient Active Problem List   Diagnosis Code    Closed fracture of right distal radius S52.501A       Past Medical History:        Diagnosis Date    Arnold-Chiari malformation ([address]     Glaucoma     Thyroid disease     hypothyroidism    TIA (transient ischemic attack)        Past Surgical History:        Procedure Laterality Date    ABDOMEN SURGERY  1995    benign tumor    COLONOSCOPY  12-    CSF SHUNT  1985    arnold chiari malformation - left ear       Social History:    Social History     Tobacco Use    Smoking status: Never Smoker    Smokeless tobacco: Never Used   Substance Use Topics    Alcohol use: No                                Counseling given: Not Answered      Vital Signs (Current):   Vitals:    02/21/22 0932   Weight: 130 lb (59 kg)   Height: 5' 1\" (1.549 m)                                              BP Readings from Last 3 Encounters:   02/16/22 (!) 144/87   12/28/15 117/68       NPO Status:                                                                                 BMI:   Wt Readings from Last 3 Encounters:   02/21/22 130 lb (59 kg)   02/18/22 130 lb (59 kg)   02/16/22 130 lb (59 kg)     Body mass index is 24.56 kg/m². CBC: No results found for: WBC, RBC, HGB, HCT, MCV, RDW, PLT    CMP: No results found for: NA, K, CL, CO2, BUN, CREATININE, GFRAA, AGRATIO, LABGLOM, GLUCOSE, GLU, PROT, CALCIUM, BILITOT, ALKPHOS, AST, ALT    POC Tests: No results for input(s): POCGLU, POCNA, POCK, POCCL, POCBUN, POCHEMO, POCHCT in the last 72 hours.     Coags: No results found for: PROTIME, INR, APTT    HCG (If Applicable): No results found for: PREGTESTUR, PREGSERUM, HCG, HCGQUANT     ABGs: No results found for: PHART, PO2ART, CNS1ADQ, XMF5COA, BEART, Q2YZIAMY     Type & Screen (If Applicable):  No results found for: LABABO, LABRH    Drug/Infectious Status (If Applicable):  No results found for: HIV, HEPCAB    COVID-19 Screening (If Applicable): No results found for: COVID19        Anesthesia Evaluation  Patient summary reviewed no history of anesthetic complications:   Airway: Mallampati: II  TM distance: >3 FB   Neck ROM: full  Mouth opening: > = 3 FB Dental: normal exam         Pulmonary:Negative Pulmonary ROS and normal exam  breath sounds clear to auscultation                             Cardiovascular:Negative CV ROS  Exercise tolerance: good (>4 METS),           Rhythm: regular  Rate: normal                    Neuro/Psych:   Negative Neuro/Psych ROS  (+) TIA,              ROS comment: Chiari malformation s/p shunt GI/Hepatic/Renal: Neg GI/Hepatic/Renal ROS            Endo/Other: Negative Endo/Other ROS                    Abdominal:             Vascular: negative vascular ROS. Other Findings:        Pre-Operative Diagnosis: Closed fracture of distal end of right radius, unspecified fracture morphology, initial encounter [S52.501A]    80 y.o.   BMI:  Body mass index is 24.56 kg/m².      Vitals:    02/21/22 0932 02/24/22 0808   BP:  (!) 145/81   Pulse:  73   Resp:  16   Temp:  97.5 °F (36.4 °C)   SpO2:  97%   Weight: 130 lb (59 kg)    Height: 5' 1\" (1.549 m)        No Known Allergies    Social History     Tobacco Use    Smoking status: Never Smoker    Smokeless tobacco: Never Used   Substance Use Topics    Alcohol use: No       LABS:    CBC  No results found for: WBC, HGB, HCT, PLT  RENAL  No results found for: NA, K, CL, CO2, BUN, CREATININE, GLUCOSE  COAGS  No results found for: PROTIME, INR, APTT          Anesthesia Plan      general     ASA 2     (I discussed with the patient the risks and benefits of regional anesthesia (inlcuding infection, bleeding, damage to nerves and surrounding structures) PIV, General, IV Narcotics, PACU. All questions were answered the patient agrees with the plan and wishes to proceed.)  Induction: intravenous.                           Albert Saenz MD   2/24/2022

## 2022-02-24 NOTE — OP NOTE
Operative Note      Patient: Marlen Fulton  YOB: 1950  MRN: [id number]    Date of Procedure: 2/24/2022    Pre-Op Diagnosis: CLOSED FRACTURE OF RIGHT DISTAL RADIUS    Post-Op Diagnosis: Same       Procedure(s):  OPEN REDUCTION INTERNAL FIXATION RIGHT DISTAL RADIUS FRACTURE WITH BLOCK FOR PAIN CONTROL    Surgeon(s):  Flower Whitehead MD    Assistant:   Surgical Assistant: Yuni Hernandez    Anesthesia: General    Estimated Blood Loss (mL): less than 50     Complications: None    Specimens:   * No specimens in log *    Implants:  Implant Name Type Inv. Item Serial No.  Lot No. LRB No. Used Action   SCREW BONE L13MM THRD DIA2. 4MM HD DIA3. 8MM COR DIA1. 6MM - [id number]  SCREW BONE L13MM THRD DIA2. 4MM HD DIA3. 8MM COR DIA1. 6MM  [facility]  Right 1 Implanted   SCREW BONE L14MM THRD DIA2. 4MM HD DIA3. 8MM COR DIA1. 6MM - [id number]  SCREW BONE L14MM THRD DIA2. 4MM HD DIA3. 8MM COR DIA1. 6MM  [facility]  Right 2 Implanted   SCREW BNE LCK 2.4X14 MM T7 DRVR STRL EVOS - [id number]  SCREW BNE LCK 2.4X14 MM T7 DRVR STRL EVOS  [facility]  Right 1 Implanted   SCREW BNE LCK 2.4X18 MM T7 DRVR STRL EVOS - [id number]  SCREW BNE LCK 2.4X18 MM T7 DRVR STRL EVOS  [facility]  Right 2 Implanted   SCREW BNE LCK 2.4X20 MM T7 DRVR STRL EVOS - [id number]  SCREW BNE LCK 2.4X20 MM T7 DRVR STRL EVOS  [facility]  Right 1 Implanted   PLATE BONE [id number] STD 3 H RT DSTL VOLAR RAD S STL LO PROF - [id number]  PLATE BONE [id number] STD 3 H RT DSTL VOLAR RAD S STL LO PROF  [facility]  Right 1 Implanted     Tourniquet time: 41 min at 250mm Hg      Drains: * No LDAs found *    Findings: displaced distal radius fracture with interposed pronator quadratus. Significant dorsal angulation. Comminution involving the radial styloid. Detailed Description of Procedure:    The patient was positively identified in the preoperative holding area by attending surgeon. Informed consent was verified and placed on the chart. The right upper extremity was marked appropriately. The patient was then taken the operating room by anesthesia team.  A supraclavicular nerve block was performed for postoperative pain control. General anesthesia was induced. Patient was then positioned supine with all bony prominences well-padded. Nonsterile tourniquet was placed high on the right arm but not yet inflated. The limb was examined. There is diffuse ecchymosis about the volar aspect of the forearm. There were no cuts, open wounds, or abrasions. At this point, the right upper extremity was prepped and draped in a standard sterile fashion. A timeout was held to verify the correct patient, operative site, laterality, and procedure. Everyone in the room was in agreement. Next, the limb was exsanguinated via an Esmarch and the tourniquet was inflated to 250 mmHg. A standard FCR approach was utilized to the distal radius. A longitudinal incision was made overlying the palpable FCR tendon. This extended from the proximal wrist crease proximally. Dissection was carried sharply just through the skin. Tenotomy scissors were used to bluntly dissect down to the FCR tendon sheath. This was incised longitudinally in line with the fibers. Next, the FCR tendon was taken ulnarly in order to protect the median nerve. The deep aspect of the sheath was incised. The FPL muscle belly was swept ulnarly. At this point the pronator quadratus was identified. A portion of the pronator quadratus was interposed in the fracture site. The pronator quadratus was incised on its radial border. A key elevator was used to sweep it ulnarly to visualize the fracture site. Pronator quadratus and muscle belly was removed from the fracture site using combination of rongeur and a hemostat. Copious irrigation was performed.   At this point, the brachioradialis was released from the radial styloid. There is noted to be comminution of the radial styloid in this area which did not allow for appropriate key to anatomic reduction. There was significant dorsal angulation of the fracture site. At this point, a freer elevator was placed in the fracture site and a reduction maneuver was performed. The distal radius was levered into more appropriate position. Heavy K wire was introduced from the radial styloid to stabilize the fracture provisionally in this position. Next, a standard with volar distal radius plate was selected from the set. Was provisionally pinned in place using K wires. At this point, the C-arm fluoroscopy was brought in to evaluate this. It was felt that a near-anatomic reduction was achieved. There is appropriate placement of the hardware. This was fine-tuned as necessary. Using the locking guides, distal locking screws were placed into the distal row of the plate as well as into the radial styloid. These were sequentially drilled, measured, and inserted. Next, the plate was slightly off the bone proximally. This was a planned maneuver to reduce additional volar tilt. At this point, the plate was reduced to the bone and fixated with bicortical screws. Repeat C arm fluoroscopy images demonstrated appropriate reduction and hardware placement. At this point, final images were saved. The wound was copiously irrigated with sterile saline solution. Tourniquet was let down. Electrocautery was used to ensure careful hemostasis. The wound was then closed in a layered fashion using 3-0 Vicryl and 4-0 nylon suture in an interrupted fashion. A sterile dressing of Xeroform, gauze, and a short volar resting splint was applied. This was overwrapped with an Ace wrap. The patient was then awakened from general anesthesia. She was transitioned back to the hospital bed. She was taken to the postoperative recovery area in apparent stable condition.   There were no immediate complication. All sponge and needle counts are correct.     Electronically signed by Refugioadin Mendez MD on 2/24/2022 at 10:55 AM

## 2022-02-24 NOTE — INTERVAL H&P NOTE
Update History & Physical    The patient's History and Physical of February 18, 2022 was reviewed with the patient and I examined the patient. There was no change. The surgical site was confirmed by the patient and me. Plan: The risks, benefits, expected outcome, and alternative to the recommended procedure have been discussed with the patient. Patient understands and wants to proceed with the procedure.      Electronically signed by Frankey Hidalgo, MD on 2/24/2022 at 9:30 AM

## 2022-02-24 NOTE — ANESTHESIA PROCEDURE NOTES
Peripheral Block    Patient location during procedure: pre-op  Staffing  Performed: anesthesiologist   Anesthesiologist: Monisha Adkins MD  Preanesthetic Checklist  Completed: patient identified, IV checked, site marked, risks and benefits discussed, surgical consent, monitors and equipment checked, pre-op evaluation, timeout performed, anesthesia consent given, oxygen available and patient being monitored  Peripheral Block  Patient position: sitting  Prep: ChloraPrep  Patient monitoring: cardiac monitor, continuous pulse ox, frequent blood pressure checks and IV access  Block type: Brachial plexus  Laterality: right  Injection technique: single-shot  Guidance: ultrasound guided  Local infiltration: lidocaine  Infiltration strength: 1 %  Dose: 3 mL  Supraclavicular  Provider prep: mask and sterile gloves  Local infiltration: lidocaine  Needle  Needle gauge: 21 G  Needle length: 10 cm  Needle localization: ultrasound guidance  Assessment  Injection assessment: negative aspiration for heme, no paresthesia on injection and local visualized surrounding nerve on ultrasound  Paresthesia pain: none  Slow fractionated injection: yes  Hemodynamics: stable  Additional Notes  Immediately prior to procedure a \"time out\" was called to verify the correct patient, allergies, laterality, procedure and equipment. Time out performed with  RN    Local Anesthetic: 0.5 %  bupivacaine   Amount: 20 ml  in 5 ml increments after negative aspiration each time. Anterior scalene and middle scalene muscles, 1st rib and pleura, brachial plexus (upper, middle and lower trunk) and Subclavian artery are identified, the tip of the needle and the spread of the local anesthetic around the brachial plexus are visualized. The Brachial plexus appeared to be anatomically normal and there were no abnormal pathologically findings seen.          Reason for block: post-op pain management and at surgeon's request

## 2022-02-24 NOTE — ANESTHESIA POSTPROCEDURE EVALUATION
Department of Anesthesiology  Postprocedure Note    Patient: Berton Gilford  MRN: [id number]  YOB: 1950  Date of evaluation: 2/24/2022  Time:  2:46 PM     Procedure Summary     Date: 02/24/22 Room / Location: [address] [place] [address]    Anesthesia Start: 9211 Anesthesia Stop: 1108    Procedure: OPEN REDUCTION INTERNAL FIXATION RIGHT DISTAL RADIUS FRACTURE WITH BLOCK FOR PAIN CONTROL (Right Arm Lower) Diagnosis:       Closed fracture of distal end of right radius, unspecified fracture morphology, initial encounter      (CLOSED FRACTURE OF RIGHT DISTAL RADIUS)    Surgeons: Gael Liz MD Responsible Provider: Evangelina Huizar MD    Anesthesia Type: general ASA Status: 2          Anesthesia Type: general    Edwin Phase I: Edwin Score: 8    Edwin Phase II: Edwin Score: 10    Last vitals: Reviewed and per EMR flowsheets.        Anesthesia Post Evaluation    Comments: Postoperative Anesthesia Note    Name:    Berton Gilford  MRN:      [id number]    Patient Vitals in the past 12 hrs:  02/24/22 1215, BP:(!) 110/95, Pulse:58, Resp:18, SpO2:95 %  02/24/22 1210, Pulse:59, Resp:20, SpO2:93 %  02/24/22 1200, BP:127/72, Pulse:58, Resp:16, SpO2:96 %  02/24/22 1145, BP:116/70, Temp:97.6 °F (36.4 °C), Pulse:58, Resp:9, SpO2:97 %  02/24/22 1130, BP:121/67, Pulse:68, Resp:14, SpO2:96 %  02/24/22 1126, Pulse:69  02/24/22 1125, BP:118/70, Pulse:59, Resp:11, SpO2:94 %  02/24/22 1120, BP:117/71, Pulse:66, Resp:11, SpO2:95 %  02/24/22 1119, Pulse:67, Resp:14, SpO2:92 %  02/24/22 1115, BP:117/71, Temp:98 °F (36.7 °C), Pulse:67, Resp:14, SpO2:92 %  02/24/22 1110, BP:105/62, Pulse:58, Resp:15, SpO2:92 %  02/24/22 1107, BP:108/63, Temp:98.2 °F (36.8 °C), Pulse:59, Resp:14, SpO2:92 %  02/24/22 0808, BP:(!) 145/81, Temp:97.5 °F (36.4 °C), Pulse:73, Resp:16, SpO2:97 %     LABS:    CBC  No results found for: WBC, HGB, HCT, PLT  RENAL  No results found for: NA, K, CL, CO2, BUN, CREATININE, GLUCOSE  COAGS  No results found for: PROTIME, INR, APTT    Intake & Output:  @08YOSA@    Nausea & Vomiting:  No    Level of Consciousness:  Awake    Pain Assessment:  Adequate analgesia    Anesthesia Complications:  No apparent anesthetic complications    SUMMARY      Vital signs stable  OK to discharge from Stage I post anesthesia care.   Care transferred from Anesthesiology department on discharge from perioperative area

## 2022-02-24 NOTE — PROGRESS NOTES
Family updated in waiting room. Discharge instructions reviewed with patient and responsible adult. Discharge instructions signed and copy given with no additional questions. Patient to be discharged home with belongings.  Script sent to pharmacy per Md.

## 2022-03-04 ENCOUNTER — OFFICE VISIT (OUTPATIENT)
Dept: ORTHOPEDIC SURGERY | Age: 72
End: 2022-03-04
Payer: MEDICARE

## 2022-03-04 DIAGNOSIS — Z87.81 STATUS POST OPEN REDUCTION AND INTERNAL FIXATION (ORIF) OF FRACTURE: Primary | ICD-10-CM

## 2022-03-04 DIAGNOSIS — Z98.890 STATUS POST OPEN REDUCTION AND INTERNAL FIXATION (ORIF) OF FRACTURE: Primary | ICD-10-CM

## 2022-03-04 PROCEDURE — 99024 POSTOP FOLLOW-UP VISIT: CPT | Performed by: ORTHOPAEDIC SURGERY

## 2022-03-04 PROCEDURE — L3908 WHO COCK-UP NONMOLDE PRE OTS: HCPCS | Performed by: ORTHOPAEDIC SURGERY

## 2022-03-04 NOTE — PROGRESS NOTES
ORTHOPAEDIC SURGERY FOLLOWUP VISIT    CHIEF COMPLAINT:  Right wrist    DATE OF INJURY: 2/16/2022  DIAGNOSIS: Right distal radius fracture  DATE OF SURGERY: 2/24/2022, ORIF right distal radius fracture    HISTORY OF PRESENT ILLNESS:  50-year-old right-hand-dominant female presents for evaluation of a right distal radius fracture. She is postoperative day #8 from ORIF. She indicates that her pain is 0 out of 10. She states that her pain has improved tremendously after surgery. She denies numbness or tingling. She has not had any fevers, chills, or night sweats. She has been compliant with weightbearing restrictions. She has had some stiffness to her fingers. PHYSICAL EXAM:  General: Well-appearing female of stated age. No acute distress. Right upper extremity: Presents in a well-padded short arm volar resting splint. This was removed in order to examine the skin. There is minor serosanguineous drainage on the dressing. This is dry. There is no active drainage. Incision appears pristine. Is well approximated with nylon sutures. There is no signs of dehiscence. No open areas. No surrounding erythema. There is bruising about the incision site as well as proximally up the volar forearm. There is minimal tenderness to palpation about the wound. She has 70 degrees supination and nearly 90 degrees pronation. There is mild limitation to radial/ulnar deviation. There is pain at extremes of motion. There is flexion/extension to 40 degree arc related to pain. She is able to make a 60% fist.  There is tightness experienced about the dorsal wrist with attempts at making a fist.  Sensation is intact to light touch in median, radial, ulnar, and axillary distributions. Motor function is intact to AIN, PIN, ulnar motor nerves. There is a strong palpable radial pulse, and brisk capillary refill to the fingers.   Compartments are soft and compressible    RADIOGRAPHIC EXAM:  4 views of the right wrist

## 2022-03-16 ENCOUNTER — HOSPITAL ENCOUNTER (OUTPATIENT)
Dept: OCCUPATIONAL THERAPY | Age: 72
Setting detail: THERAPIES SERIES
Discharge: HOME | End: 2022-03-16
Payer: MEDICARE

## 2022-03-16 PROCEDURE — 97112 NEUROMUSCULAR REEDUCATION: CPT | Performed by: OCCUPATIONAL THERAPIST

## 2022-03-16 PROCEDURE — 97165 OT EVAL LOW COMPLEX 30 MIN: CPT | Performed by: OCCUPATIONAL THERAPIST

## 2022-03-16 NOTE — PLAN OF CARE
[address] [place] [address],[address] [place], [address] [place] [address] [address]  Phone: [phone] Fax: [phone]     Occupational Therapy/Hand Therapy Certification      Dear Referring Practitioner: Krisetn Piper    We had the pleasure of evaluating the following patient for occupational therapy services at [address] [place] [address]. A summary of our findings can be found in the initial assessment below. This includes our plan of care. If you have any questions or concerns regarding these findings, please do not hesitate to contact me at the office phone number checked above. Thank you for the referral.     Physician Signature:_______________________________Date:__________________  By signing above (or electronic signature), therapists plan is approved by physician      Patient: Donald Vaughan   : 1950   MRN: [id number]  Referring Physician: Referring Practitioner: Kristen Piper      Evaluation Date: 3/16/2022      Medical Diagnosis Information:  Diagnosis: Z98.890, Z87.81 (ICD-10-CM) - Status post open reduction and internal fixation (ORIF) of fracture;   Treatment Dx:  R wrist pain M25.531            Date of Injury: 22  Date of Surgery: 22                                  Insurance information:  [facility]     Date of Patient follow up with Physician: 22    [x] Patient reported history, allergies, and medications reviewed - see intake form. Preferred Language for Healthcare:   [x]English       []other:       RESTRICTIONS/PRECAUTIONS: post op precautions for Distal radius fx ORIF    Latex Allergy:  [x]No      []Yes  Pacemaker:  [] No       [] Yes       Functional Scale: 56 % (Quick DASH)   Date assessed:  3/16/2022      C-SSRS Triggered by Intake questionnaire (Past 2 wk assessment):    No, Questionnaire did not trigger screening.         SUBJECTIVE: Patient reported deficits/history of current problem: Pt reports she tripped over log when walking her dog    Pain Scale: 4/10  [x]Constant      []Intermittent    []other:  Pain Location:  wrist  Easing factors: rest  Provocative factors: ROM     OBJECTIVE:   Date:  Hand Dominance:     [x]  Right    [] Left 3/16/2022     Objective Measures:    PAIN /10   Quick DASH 56%   Digits tip to DPFC in cm WFL   Thumb ROM WFL   Thumb opposition  WFL:   Thumb Radial/Palmar abd ROM R:  L:   Wrist ROM Ext/Flex R:38/32  L:   Rad/Uln dev ROM NT:   Forearm ROM  Sup/pron R:  L:50/65   Elbow ROM Ext/flex [facility]   Shoulder Flex  Shoulder Abd  Shoulder IR/ER   WFL   Edema in cm circumf. MCPJs R:  L:   Edema in cm circumf.   Wrist R:  L:    strength in lbs NT:   Pinch Strengthin lbs: lat  R:  L:   Pinch Strength in lbs:  3 point R:  L:     MMT:     Observations:  (including splints, bandages, incisions, scars): Sutures removed; steri strips present     Sensation:  [x] No reported deficits  [] Intact to light touch    [] [place] Darryl test completed, findings as noted:  [] Other:    Palpation: unremarkable    Occupational Profile: medium sized dog  Home Enviroment: lives with  [] spouse,  [] family,  [x] alone,  [] significant other,   [] other:    Occupation/School: computer use;     Recreational Activities/Meaningful Interests: walking dog    Prior Level of Function: [x] Independent with ADLs/IADLs     [] Assistance needed (describe):    Patient-Identified Primary Performance Deficits (to be addressed in POC):   [x] bathing    [x] household tasks (cooking/cleaning)   [x] dressing    [] self feeding   [x] grooming    [x] work/education   [] functional mobility   [] sleeping/rest   [] toileting/hygiene   [] recreational activities   [] driving    [] community/social participation   [] other:     Comorbidities Affecting Functional Performance:     []Anxiety (F41.9)/Depression (F32.9)   []Diabetes Type 1(E10.65) or 2 (E11.65)   []Rheumatoid Arthritis (M05.9)  []Fibromyalgia (M79.7)  []Neuropathy(G60.9)  []Osteoarthritis(M19.91)  []None   [x]Other: hx of CVA with L UE weakness    Functional Mobility/Transfers/Gait:  [x] Independent - no significant gait deviations  [] Assistance needed   [] Assistive device used: Falls Risk Assessment (30 days):   [x] Falls Risk assessed and no intervention required. [] Falls Risk assessed and Patient requires intervention due to being higher risk   TUG score (>12s at risk):     [] Falls education provided, including      Review Of Systems (ROS): [x]Performed Review of systems (Integumentary, CardioPulmonary, Neurological) by intake and observation. Intake form has been scanned into medical record. Patient has been instructed to contact their primary care physician regarding ROS issues if not already being addressed at this time. ASSESSMENT:   This patient presents with signs and symptoms consistent with the medical diagnosis provided by the referring physician. Impairments (physical, cognitive and/or psychosocial):  [x] Decreased mobility   [x] Weakness    [] Hypersensitivity   [x] Pain/tenderness   [] Edema/swelling   [] Decreased coordination (fine/gross motor)   [] Impaired body mechanics  [] Sensory loss  [] Loss of balance   [] Other:      Performance Deficits (to be addressed in plan of care):   [x] Bathing    [x] Household Tasks (cooking/cleaning)   [x] Dressing    [] Self Feeding   [x] Grooming    [x] Work/Education   [] Functional Mobility   [] Sleeping/Rest   [] Toileting/Hygiene   [] Recreational Activities   [] Driving    [] Community/Social Participation   [] Other:     Rehab Potential:   [] Excellent [x] Good [] Fair  [] Poor     Barriers affecting rehab potential:  []Age    []Lack of Motivation   []Co-Morbidities  []Cognitive Function  []Environmental/home/work barriers  []Other:     Tolerance of evaluation/treatment:    [] Excellent [x] Good [] Fair  [] Poor    PLAN OF CARE:  Interventions:   [x] Therapeutic Exercise [x] Therapeutic Activity    [x] Activities of Daily Living [x] Neuromuscular Re-education      [x] Patient Education  [x] Manual Therapy      [x] Modalities as needed, and not otherwise contraindicated, including: ultrasound,paraffin,moist heat/cold pack, electrical stimulation, contrast bath, iontophoresis  [] Splinting    Frequency/Duration:  2 days per week for 12 weeks      GOALS: to be able to use wrist and hand. Patient stated goal: To  Use her right arm    [] Progressing: [] Met: [] Not Met: [] Adjusted    Therapist goals for Patient:   Short Term Goals: To be achieved in: 2 weeks  1. Independent in HEP and progression per patient tolerance, in order to prevent re-injury. [] Progressing: [] Met: [] Not Met: [] Adjusted   2. Patient will have a decrease in pain to facilitate improvement in movement, function, and ADLs as indicated by Functional Deficits. [] Progressing: [] Met: [] Not Met: [] Adjusted    Long Term Goals to be achieved in 12 weeks (through 6/11/22), including patient directed goals to address patient identified performance deficits:  1) Pt to be independent in graded HEP progression with a good level of effort and compliance. [] Progressing: [] Met: [] Not Met: [] Adjusted   2) Pt to report a score of </= 30 % on the Quick DASH disability questionnaire for increased performance with carrying, moving, and handling objects. [] Progressing: [] Met: [] Not Met: [] Adjusted   3) Pt will demonstrate increased ROM to [facility] for improved independence with self care, home mgt, and vocational responsibilities. [] Progressing: [] Met: [] Not Met: [] Adjusted   4) Pt will demonstrate increased  strength to at least 50% of other hand for improved independence with self care, home mgt, and vocational responsibilities. [] Progressing: [] Met: [] Not Met: [] Adjusted   5) Pt will have a decrease in pain to 2/10 to facilitate self care, home mgt, and vocational responsibilities. .  [] Progressing: [] Met: [] Not Met: [] Adjusted        OCCUPATIONAL THERAPY EVALUATION COMPLEXITY JUSTIFICATION:    [x] An occupational profile and medical/therapy history, which includes:   [x] a brief history including medical and/or therapy records relating to the     presenting problem   [] an expanded review of medical and/or therapy records and additional review     of physical, cognitive or psychosocial history related to current functional    performance   [] an extensive additional review of review of medical and/or therapy records   and physical, cognitive, or psychosocial history related to current    functional performance    [x] An assessment that identifies performance deficits (relating to physical, cognitive, or psychosocial skills) that result in activity limitations and/or participation restrictions:   [x] 1-3 performance deficits   [] 3-5 performance deficits   [] 5 or more performance deficits    [x] Clinical decision making of:   [x] low complexity, including analysis of occupational profile, data analysis from problem focused assessment, and consideration of a limited number of treatment options. No comorbidities affect occupational performance. No task modifications or assistance needed to complete evaluation. [] moderate complexity, including analysis of occupational profile, data analysis from detailed assessment and consideration of several treatment options. Comorbidities that affect occupational performance may be present. Minimal to moderate task modifications or assistance needed to complete assessment. [] high complexity, including analysis of occupational profile, analysis of data from comprehensive assessment and consideration of multiple treatment options. Multiple comorbidities present that affect occupational performance. Significant task modifications or assistance needed to complete assessment.     Evaluation Code:  [x] Low Complexity LINDA 15872 (typically 30 minutes face to face)  [] Mod Complexity EVAL 48235 (typically 45 minutes face to face)  [] High Complexity EVAL 84246 (typically 60 minutes face to face)    Electronically signed by: Anup Davalos OTR/L [id number]

## 2022-03-16 NOTE — FLOWSHEET NOTE
[address] [place] [address],[address] [place], [address] [place] [address] [address]  Phone: [phone] Fax: [phone]     Occupational Therapy Treatment Note/ Progress Report:     Is this a Progress Report:     []  Yes  [x]  No      If Yes:  Date Range for reporting period:  Beginning 3/16/22    Ending    Progress report will be due (10 Rx or 30 days whichever is less): 53    Recertification will be due (POC Duration  / 90 days whichever is less): 22   Patient: Edel Sanderson                                    : 1950                                  MRN: [id number]  Referring Physician: Referring Practitioner: Krystal Alejandra                                           Evaluation Date: 3/16/2022                                         Medical Diagnosis Information:  Diagnosis: Z98.890, Z87.81 (ICD-10-CM) - Status post open reduction and internal fixation (ORIF) of fracture;   Treatment Dx:  R wrist pain M25.531                       Date of Injury: 22  Date of Surgery: 22                                  Insurance information:  [facility]     Date of Patient follow up with Physician: 22  Has the plan of care been signed (Y/N):        []  Yes  [x]  No       Visit # Insurance Allowable Auth Required   1 BMN []  Yes []  No    From 3/16/22  to 3/16/2022      Preferred Language for Healthcare:   [x]English       []other:     Date of Patient follow up with Physician: 22     [x]? Patient reported history, allergies, and medications reviewed - see intake form. Preferred Language for Healthcare:   [x]? English       []? other:            RESTRICTIONS/PRECAUTIONS: post op precautions for Distal radius fx ORIF     Latex Allergy:  [x]? No      []? Yes                    Pacemaker:  []? No       []?  Yes         Functional Scale: 56 % (Quick DASH)                                Date assessed:  3/16/2022        C-SSRS Triggered by Intake questionnaire (Past 2 wk assessment):    No, Questionnaire did not trigger screening.         SUBJECTIVE: Patient reported deficits/history of current problem: Pt reports she tripped over log when walking her dog     Pain Scale: 4/10          [x]? Constant                []?Intermittent              []?other:  Pain Location:  wrist  Easing factors: rest  Provocative factors: ROM      OBJECTIVE:   Date:  Hand Dominance:     [x]? Right    []? Left 3/16/2022      Objective Measures:     PAIN /10   Quick DASH 56%   Digits tip to DPFC in cm WFL   Thumb ROM WFL   Thumb opposition  WFL:   Thumb Radial/Palmar abd ROM R:  L:   Wrist ROM Ext/Flex R:38/32  L:   Rad/Uln dev ROM NT:   Forearm ROM  Sup/pron R:  L:50/65   Elbow ROM Ext/flex [facility]   Shoulder Flex  Shoulder Abd  Shoulder IR/ER    WFL   Edema in cm circumf. MCPJs R:  L:   Edema in cm circumf. Wrist R:  L:    strength in lbs NT:   Pinch Strengthin lbs: lat  R:  L:   Pinch Strength in lbs:  3 point R:  L:      MMT:      Observations:  (including splints, bandages, incisions, scars): Sutures removed; steri strips present          MODALITIES: 3/16/22      Fluidotherapy (49989)      Estim (89192/44615)      Paraffin (09108)      US (94973)      Iontophoresis (68265)      Hot Pack 10'     Cold Pack            INTERVENTIONS:      Pt educ A? PROM Forearm/wrist                                                                             Manual Therapy (68063)      (IASTM, Dry Needling, manual mobilization)            Splinting      Lcode:      Orthotic Mgmt, Subsequent Enc (70938)      Orthotic Mgmt & Training (92581)            Other:              Therapeutic Exercise & NMR:  [] (20802) Provided verbal/tactile cueing for activities related to strengthening, flexibility, endurance, ROM  for improvements in scapular, scapulothoracic and UE control with self care, reaching, carrying, lifting, house/yardwork, driving/computer work.    [] (95627) Provided verbal/tactile cueing for activities related to improving balance, coordination, kinesthetic sense, posture, motor skill, proprioception  to assist with  scapular, scapulothoracic and UE control with self care, reaching, carrying, lifting, house/yardwork, driving/computer work.     Therapeutic Activities & NMR:    [] (40474 or 62635) Provided verbal/tactile cueing for activities related to improving balance, coordination, kinesthetic sense, posture, motor skill, proprioception and motor activation to allow for proper function of scapular, scapulothoracic and UE control with self care, carrying, lifting, driving/computer work    Home Exercise Program:    [] (51763) Reviewed/Progressed HEP activities related to strengthening, flexibility, endurance, ROM of scapular, scapulothoracic and UE control with self care, reaching, carrying, lifting, house/yardwork, driving/computer work  [] (11559) Reviewed/Progressed HEP activities related to improving balance, coordination, kinesthetic sense, posture, motor skill, proprioception of scapular, scapulothoracic and UE control with self care, reaching, carrying, lifting, house/yardwork, driving/computer work      Manual Treatments:  PROM / STM / Oscillations-Mobs:  G-I, II, III, IV (PA's, Inf., Post.)  [] (80361) Provided manual therapy to mobilize soft tissue/joints of cervical/CT, scapular GHJ and UE for the purpose of modulating pain, promoting relaxation,  increasing ROM, reducing/eliminating soft tissue swelling/inflammation/restriction, improving soft tissue extensibility and allowing for proper ROM for normal function with self care, reaching, carrying, lifting, house/yardwork, driving/computer work    ADL Training:  [] (98997) Provided self-care/home management training related to activities of daily living and compensatory training, and/or use of adaptive equipment      Charges:  Timed Code Treatment Minutes: 15   Total Treatment Minutes: 45   Worker's Comp: Time In/Time Out     [x] EVAL (LOW) 82909 (typically 20 minutes face-to-face)    [] EVAL (MOD) 82550 (typically 30 minutes face-to-face)  [] EVAL (HIGH) 21317 (typically 45 minutes face-to-face)  [] OT Re-eval (90345)       [] Robert (07422) x      [] LITFG(17098)  [x] NMR (20315) x 1     [] Estim (attended) (11053)   [] Manual ([id number]) x      [] US (53782)  [] TA (13781) x      [] Paraffin (68841)  [] ADL  (97416) x     [] Splint/L code:    [] Estim (unattended) ([id number])  [] Fluidotherapy (02705)  [] Other:    Comorbidities Affecting Functional Performance:     []Anxiety (F41.9)/Depression (F32.9)   []Diabetes Type 1(E10.65) or 2 (E11.65)   []Rheumatoid Arthritis (M05.9)  []Fibromyalgia (M79.7)  []Neuropathy(G60.9)  []Osteoarthritis(M19.91)  []None   []Other:    ASSESSMENT:      GOALS: to be able to use wrist and hand. Patient stated goal: To  Use her right arm    []? Progressing: []? Met: []? Not Met: []? Adjusted     Therapist goals for Patient:   Short Term Goals: To be achieved in: 2 weeks  1. Independent in HEP and progression per patient tolerance, in order to prevent re-injury. []? Progressing: []? Met: []? Not Met: []? Adjusted   2. Patient will have a decrease in pain to facilitate improvement in movement, function, and ADLs as indicated by Functional Deficits. []? Progressing: []? Met: []? Not Met: []? Adjusted     Long Term Goals to be achieved in 12 weeks (through 6/11/22), including patient directed goals to address patient identified performance deficits:  1) Pt to be independent in graded HEP progression with a good level of effort and compliance. []? Progressing: []? Met: []? Not Met: []? Adjusted   2) Pt to report a score of </= 30 % on the Quick DASH disability questionnaire for increased performance with carrying, moving, and handling objects. []? Progressing: []? Met: []? Not Met: []? Adjusted   3) Pt will demonstrate increased ROM to [facility] for improved independence with self care, home mgt, and vocational responsibilities. []?  Progressing: []? Met: []? Not Met: []? Adjusted   4) Pt will demonstrate increased  strength to at least 50% of other hand for improved independence with self care, home mgt, and vocational responsibilities. []? Progressing: []? Met: []? Not Met: []? Adjusted   5) Pt will have a decrease in pain to 2/10 to facilitate self care, home mgt, and vocational responsibilities. .  []? Progressing: []? Met: []? Not Met: []? Adjusted       Overall Progression Towards Functional Goals/Treatment Progress Update:  [] Patient is progressing as expected towards functional goals listed. [] Progression is slowed due to complexities/impairments listed. [] Progression has been slowed due to co-morbidities. [x] Plan just implemented, too soon to assess goals progression <30 days  [] Goals require adjustment due to lack of progress  [] Patient is not progressing as expected and requires additional follow up with physician  [] All goals are met  [] Other:     Prognosis for POC: [x] Good [] Fair  [] Poor    Patient requires continued skilled intervention: [x] Yes  [] No    Treatment/Activity Tolerance:  [x] Patient able to complete treatment  [] Patient limited by fatigue  [] Patient limited by pain    [] Patient limited by other medical complications  [] Other:                  PLAN: See eval  [] Continue per plan of care [] Alter current plan (see comments above)  [x] Plan of care initiated [] Hold pending MD visit [] Discharge      Electronically signed by:  Ofelia POLANCO/L [id number]    Note: If patient does not return for scheduled/ recommended follow up visits, this note will serve as a discharge from care along with most recent update on progress.   Phone: [phone]  Fax [phone]

## 2022-03-21 ENCOUNTER — HOSPITAL ENCOUNTER (OUTPATIENT)
Dept: OCCUPATIONAL THERAPY | Age: 72
Setting detail: THERAPIES SERIES
Discharge: HOME | End: 2022-03-21
Payer: MEDICARE

## 2022-03-21 PROCEDURE — 97140 MANUAL THERAPY 1/> REGIONS: CPT | Performed by: OCCUPATIONAL THERAPIST

## 2022-03-21 PROCEDURE — 97022 WHIRLPOOL THERAPY: CPT | Performed by: OCCUPATIONAL THERAPIST

## 2022-03-21 PROCEDURE — 97110 THERAPEUTIC EXERCISES: CPT | Performed by: OCCUPATIONAL THERAPIST

## 2022-03-21 NOTE — FLOWSHEET NOTE
2 [address] [place], [address] [place] [address] [address]  Phone: [phone] Fax: [phone]     Occupational Therapy Treatment Note/ Progress Report:     Is this a Progress Report:     []  Yes  [x]  No      If Yes:  Date Range for reporting period:  Beginning 3/16/22    Ending    Progress report will be due (10 Rx or 30 days whichever is less): 6/10/53    Recertification will be due (POC Duration  / 90 days whichever is less): 22   Patient: Dallin Mccullough                                    : 1950                                  MRN: [id number]  Referring Physician: Referring Practitioner: Juanita Jameson                                           Evaluation Date: 3/16/2022                                         Medical Diagnosis Information:  Diagnosis: Z98.890, Z87.81 (ICD-10-CM) - Status post open reduction and internal fixation (ORIF) of fracture;   Treatment Dx:  R wrist pain M25.531                       Date of Injury: 22  Date of Surgery: 22                                  Insurance information:  [facility] and [facility]     Date of Patient follow up with Physician: 22  Has the plan of care been signed (Y/N):        []  Yes  [x]  No       Visit # Insurance Allowable Auth Required   2 BMN []  Yes []  No    From 3/16/22  to 3/21/2022      Preferred Language for Healthcare:   [x]English       []other:     Date of Patient follow up with Physician: 22     [x]? Patient reported history, allergies, and medications reviewed - see intake form. Preferred Language for Healthcare:   [x]? English       []? other:            RESTRICTIONS/PRECAUTIONS: post op precautions for Distal radius fx ORIF     Latex Allergy:  [x]? No      []? Yes                    Pacemaker:  []? No       []?  Yes         Functional Scale: 56 % (Quick DASH)                                Date assessed:  3/16/2022        C-SSRS Triggered by Intake questionnaire (Past 2 wk assessment):    No, Questionnaire did not trigger screening.         SUBJECTIVE:   Pt reports she has been working hard with HEP. Also reports c/o pain at dorsal side of hand. Patient reported deficits/history of current problem: Pt reports she tripped over log when walking her dog     Pain Scale: 4/10          [x]? Constant                []?Intermittent              []?other:  Pain Location:  wrist  Easing factors: rest  Provocative factors: ROM      OBJECTIVE:   Date:  Hand Dominance:     [x]? Right    []? Left 3/16/2022      Objective Measures:     PAIN /10   Quick DASH 56%   Digits tip to DPFC in cm WFL   Thumb ROM WFL   Thumb opposition  WFL:   Thumb Radial/Palmar abd ROM R:  L:   Wrist ROM Ext/Flex R:38/32  L:   Rad/Uln dev ROM NT:   Forearm ROM  Sup/pron R:  L:50/65   Elbow ROM Ext/flex [facility]   Shoulder Flex  Shoulder Abd  Shoulder IR/ER    WFL   Edema in cm circumf. MCPJs R:  L:   Edema in cm circumf. Wrist R:  L:    strength in lbs NT:   Pinch Strengthin lbs: lat  R:  L:   Pinch Strength in lbs:  3 point R:  L:      MMT:      Observations:  (including splints, bandages, incisions, scars): Sutures removed; steri strips present          MODALITIES: 3/16/22  3/21/22    Fluidotherapy (24882)  15    Estim (98894/29629)      Paraffin (85776)      US (93485)      Iontophoresis (61878)      Hot Pack 10'     Cold Pack            INTERVENTIONS:      Pt educ A? PROM Forearm/wrist AROM/PROM      Power web                                                                      Manual Therapy (09525)  STM, scar massage.  Removal of steri strips    (IASTM, Dry Needling, manual mobilization)            Splinting  *removed metal stay on dorsal side of prefab splint as it appears to be irritating dorsum of hand    Lcode:      Orthotic Mgmt, Subsequent Enc (05812)      Orthotic Mgmt & Training (99773)            Other:              Therapeutic Exercise & NMR:  [] (15217) Provided verbal/tactile cueing for activities related to strengthening, flexibility, endurance, ROM  for improvements in scapular, scapulothoracic and UE control with self care, reaching, carrying, lifting, house/yardwork, driving/computer work.    [] (03037) Provided verbal/tactile cueing for activities related to improving balance, coordination, kinesthetic sense, posture, motor skill, proprioception  to assist with  scapular, scapulothoracic and UE control with self care, reaching, carrying, lifting, house/yardwork, driving/computer work.     Therapeutic Activities & NMR:    [] (34850 or 16150) Provided verbal/tactile cueing for activities related to improving balance, coordination, kinesthetic sense, posture, motor skill, proprioception and motor activation to allow for proper function of scapular, scapulothoracic and UE control with self care, carrying, lifting, driving/computer work    Home Exercise Program:    [] (02853) Reviewed/Progressed HEP activities related to strengthening, flexibility, endurance, ROM of scapular, scapulothoracic and UE control with self care, reaching, carrying, lifting, house/yardwork, driving/computer work  [] (50651) Reviewed/Progressed HEP activities related to improving balance, coordination, kinesthetic sense, posture, motor skill, proprioception of scapular, scapulothoracic and UE control with self care, reaching, carrying, lifting, house/yardwork, driving/computer work      Manual Treatments:  PROM / STM / Oscillations-Mobs:  G-I, II, III, IV (PA's, Inf., Post.)  [] (76950) Provided manual therapy to mobilize soft tissue/joints of cervical/CT, scapular GHJ and UE for the purpose of modulating pain, promoting relaxation,  increasing ROM, reducing/eliminating soft tissue swelling/inflammation/restriction, improving soft tissue extensibility and allowing for proper ROM for normal function with self care, reaching, carrying, lifting, house/yardwork, driving/computer work    ADL Training:  [] (62815) Provided self-care/home management training related to activities of daily living and compensatory training, and/or use of adaptive equipment      Charges:  Timed Code Treatment Minutes: 45'   Total Treatment Minutes: 45   Worker's Comp: Time In/Time Out     [x] EVAL (LOW) 94315 (typically 20 minutes face-to-face)    [] EVAL (MOD) 70086 (typically 30 minutes face-to-face)  [] EVAL (HIGH) 72803 (typically 45 minutes face-to-face)  [] OT Re-eval (63805)       [x] Robert [phone]) x 1     [] ZJZBU(24119)  [] NMR (65665) x      [] Estim (attended) (66320)   [x] Manual ([phone]) x 1     [] US (35044)  [] TA (36331) x      [] Paraffin (98191)  [] ADL  (59052) x     [] Splint/L code:    [] Estim (unattended) [phone])  [x] Fluidotherapy (23715)  [] Other:    Comorbidities Affecting Functional Performance:     []Anxiety (F41.9)/Depression (F32.9)   []Diabetes Type 1(E10.65) or 2 (E11.65)   []Rheumatoid Arthritis (M05.9)  []Fibromyalgia (M79.7)  []Neuropathy(G60.9)  []Osteoarthritis(M19.91)  []None   []Other:    ASSESSMENT:      GOALS: to be able to use wrist and hand. Patient stated goal: To  Use her right arm    []? Progressing: []? Met: []? Not Met: []? Adjusted     Therapist goals for Patient:   Short Term Goals: To be achieved in: 2 weeks  1. Independent in HEP and progression per patient tolerance, in order to prevent re-injury. []? Progressing: []? Met: []? Not Met: []? Adjusted   2. Patient will have a decrease in pain to facilitate improvement in movement, function, and ADLs as indicated by Functional Deficits. []? Progressing: []? Met: []? Not Met: []? Adjusted     Long Term Goals to be achieved in 12 weeks (through 6/11/22), including patient directed goals to address patient identified performance deficits:  1) Pt to be independent in graded HEP progression with a good level of effort and compliance. []? Progressing: []? Met: []? Not Met: []?  Adjusted   2) Pt to report a score of </= 30 % on the Quick DASH disability questionnaire for increased performance with carrying, moving, and handling objects. []? Progressing: []? Met: []? Not Met: []? Adjusted   3) Pt will demonstrate increased ROM to [facility] for improved independence with self care, home mgt, and vocational responsibilities. []? Progressing: []? Met: []? Not Met: []? Adjusted   4) Pt will demonstrate increased  strength to at least 50% of other hand for improved independence with self care, home mgt, and vocational responsibilities. []? Progressing: []? Met: []? Not Met: []? Adjusted   5) Pt will have a decrease in pain to 2/10 to facilitate self care, home mgt, and vocational responsibilities. .  []? Progressing: []? Met: []? Not Met: []? Adjusted       Overall Progression Towards Functional Goals/Treatment Progress Update:  [] Patient is progressing as expected towards functional goals listed. [] Progression is slowed due to complexities/impairments listed. [] Progression has been slowed due to co-morbidities. [x] Plan just implemented, too soon to assess goals progression <30 days  [] Goals require adjustment due to lack of progress  [] Patient is not progressing as expected and requires additional follow up with physician  [] All goals are met  [] Other:     Prognosis for POC: [x] Good [] Fair  [] Poor    Patient requires continued skilled intervention: [x] Yes  [] No    Treatment/Activity Tolerance:  [x] Patient able to complete treatment  [] Patient limited by fatigue  [] Patient limited by pain    [] Patient limited by other medical complications  [] Other:                  PLAN: See eval  [] Continue per plan of care [] Alter current plan (see comments above)  [x] Plan of care initiated [] Hold pending MD visit [] Discharge      Electronically signed by:  Ofelia POLANCO/L [id number]    Note: If patient does not return for scheduled/ recommended follow up visits, this note will serve as a discharge from care along with most recent update on progress.   Phone: [phone]  Fax [phone]

## 2022-03-23 ENCOUNTER — HOSPITAL ENCOUNTER (OUTPATIENT)
Dept: OCCUPATIONAL THERAPY | Age: 72
Setting detail: THERAPIES SERIES
Discharge: HOME | End: 2022-03-23
Payer: MEDICARE

## 2022-03-23 PROCEDURE — 97110 THERAPEUTIC EXERCISES: CPT | Performed by: OCCUPATIONAL THERAPIST

## 2022-03-23 PROCEDURE — 97112 NEUROMUSCULAR REEDUCATION: CPT | Performed by: OCCUPATIONAL THERAPIST

## 2022-03-23 PROCEDURE — 97140 MANUAL THERAPY 1/> REGIONS: CPT | Performed by: OCCUPATIONAL THERAPIST

## 2022-03-23 PROCEDURE — 97022 WHIRLPOOL THERAPY: CPT | Performed by: OCCUPATIONAL THERAPIST

## 2022-03-23 NOTE — FLOWSHEET NOTE
2 [address] [place], [address] [place] [address] [address]  Phone: [phone] Fax: [phone]     Occupational Therapy Treatment Note/ Progress Report:     Is this a Progress Report:     []  Yes  [x]  No      If Yes:  Date Range for reporting period:  Beginning 3/16/22    Ending    Progress report will be due (10 Rx or 30 days whichever is less):     Recertification will be due (POC Duration  / 90 days whichever is less): 22   Patient: Dallin Mccullough                                    : 1950                                  MRN: [id number]  Referring Physician: Referring Practitioner: Juanita Jameson                                           Evaluation Date: 3/16/2022                                         Medical Diagnosis Information:  Diagnosis: Z98.890, Z87.81 (ICD-10-CM) - Status post open reduction and internal fixation (ORIF) of fracture;   Treatment Dx:  R wrist pain M25.531                       Date of Injury: 22  Date of Surgery: 22                                  Insurance information:  [facility] and [facility]     Date of Patient follow up with Physician: 22  Has the plan of care been signed (Y/N):        []  Yes  [x]  No       Visit # Insurance Allowable Auth Required   3 BMN []  Yes []  No    From 3/16/22  to 3/23/2022      Preferred Language for Healthcare:   [x]English       []other:     Date of Patient follow up with Physician: 22     [x]? Patient reported history, allergies, and medications reviewed - see intake form. Preferred Language for Healthcare:   [x]? English       []? other:            RESTRICTIONS/PRECAUTIONS: post op precautions for Distal radius fx ORIF     Latex Allergy:  [x]? No      []? Yes                    Pacemaker:  []? No       []?  Yes         Functional Scale: 56 % (Quick DASH)                                Date assessed:  3/16/2022        C-SSRS Triggered by Intake questionnaire (Past 2 wk assessment):    No, Questionnaire did not trigger screening.         SUBJECTIVE:   Pt reports she has been doing scar massage at home. Reports that she has less discomfort from splint rubbing on dorsal wrist.    Patient reported deficits/history of current problem: Pt reports she tripped over log when walking her dog     Pain Scale: 4/10          [x]? Constant                []?Intermittent              []?other:  Pain Location:  wrist  Easing factors: rest  Provocative factors: ROM      OBJECTIVE:   Date:  Hand Dominance:     [x]? Right    []? Left 3/16/2022      Objective Measures:     PAIN /10   Quick DASH 56%   Digits tip to DPFC in cm WFL   Thumb ROM WFL   Thumb opposition  WFL:   Thumb Radial/Palmar abd ROM R:  L:   Wrist ROM Ext/Flex R:38/32  L:   Rad/Uln dev ROM NT:   Forearm ROM  Sup/pron R:  L:50/65   Elbow ROM Ext/flex [facility]   Shoulder Flex  Shoulder Abd  Shoulder IR/ER    WFL   Edema in cm circumf. MCPJs R:  L:   Edema in cm circumf. Wrist R:  L:    strength in lbs NT:   Pinch Strengthin lbs: lat  R:  L:   Pinch Strength in lbs:  3 point R:  L:      MMT:      Observations:  (including splints, bandages, incisions, scars): Sutures removed; steri strips present          MODALITIES: 3/16/22  3/21/22 3/23/22   Fluidotherapy (33198)  15 10'   Estim (68889/56320)      Paraffin (40420)      US (07972)      Iontophoresis (20215)      Hot Pack 10'     Cold Pack            INTERVENTIONS:      Pt educ A? PROM Forearm/wrist AROM/PROM AROM/PROM     Power web Power web      UE bike 4'      Yellow putty  Rolling, shaping, mallet            Red flex bar x20 wrist                                             Manual Therapy (77020)  STM, scar massage. Removal of steri strips STM, scar massage.     (IASTM, Dry Needling, manual mobilization)            Splinting  *removed metal stay on dorsal side of prefab splint as it appears to be irritating dorsum of hand    Lcode:      Orthotic Mgmt, Subsequent Enc (97931) normal function with self care, reaching, carrying, lifting, house/yardwork, driving/computer work    ADL Training:  [] (46912) Provided self-care/home management training related to activities of daily living and compensatory training, and/or use of adaptive equipment      Charges:  Timed Code Treatment Minutes: 45'   Total Treatment Minutes: 45   Worker's Comp: Time In/Time Out     [] EVAL (LOW) 52578 (typically 20 minutes face-to-face)    [] EVAL (MOD) 30462 (typically 30 minutes face-to-face)  [] EVAL (HIGH) 15659 (typically 45 minutes face-to-face)  [] OT Re-eval (09666)       [x] Robert ([address]) x 1     [] UPVHP(78152)  [x] NMR (61040) x      [] Estim (attended) (07919)   [x] Manual ([phone]) x 1     [] US (49683)  [] TA (03942) x      [] Paraffin (47356)  [] ADL  ([phone]) x     [] Splint/L code:    [] Estim (unattended) ([phone])  [x] Fluidotherapy (21360)  [] Other:    Comorbidities Affecting Functional Performance:     []Anxiety (F41.9)/Depression (F32.9)   []Diabetes Type 1(E10.65) or 2 (E11.65)   []Rheumatoid Arthritis (M05.9)  []Fibromyalgia (M79.7)  []Neuropathy(G60.9)  []Osteoarthritis(M19.91)  []None   []Other:    ASSESSMENT:      GOALS: to be able to use wrist and hand. Patient stated goal: To  Use her right arm    []? Progressing: []? Met: []? Not Met: []? Adjusted     Therapist goals for Patient:   Short Term Goals: To be achieved in: 2 weeks  1. Independent in HEP and progression per patient tolerance, in order to prevent re-injury. []? Progressing: []? Met: []? Not Met: []? Adjusted   2. Patient will have a decrease in pain to facilitate improvement in movement, function, and ADLs as indicated by Functional Deficits. []? Progressing: []? Met: []? Not Met: []?  Adjusted     Long Term Goals to be achieved in 12 weeks (through 6/11/22), including patient directed goals to address patient identified performance deficits:  1) Pt to be independent in graded HEP progression with a good level of effort and compliance. []? Progressing: []? Met: []? Not Met: []? Adjusted   2) Pt to report a score of </= 30 % on the Quick DASH disability questionnaire for increased performance with carrying, moving, and handling objects. []? Progressing: []? Met: []? Not Met: []? Adjusted   3) Pt will demonstrate increased ROM to [facility] for improved independence with self care, home mgt, and vocational responsibilities. []? Progressing: []? Met: []? Not Met: []? Adjusted   4) Pt will demonstrate increased  strength to at least 50% of other hand for improved independence with self care, home mgt, and vocational responsibilities. []? Progressing: []? Met: []? Not Met: []? Adjusted   5) Pt will have a decrease in pain to 2/10 to facilitate self care, home mgt, and vocational responsibilities. .  []? Progressing: []? Met: []? Not Met: []? Adjusted       Overall Progression Towards Functional Goals/Treatment Progress Update:  [] Patient is progressing as expected towards functional goals listed. [] Progression is slowed due to complexities/impairments listed. [] Progression has been slowed due to co-morbidities.   [x] Plan just implemented, too soon to assess goals progression <30 days  [] Goals require adjustment due to lack of progress  [] Patient is not progressing as expected and requires additional follow up with physician  [] All goals are met  [] Other:     Prognosis for POC: [x] Good [] Fair  [] Poor    Patient requires continued skilled intervention: [x] Yes  [] No    Treatment/Activity Tolerance:  [x] Patient able to complete treatment  [] Patient limited by fatigue  [] Patient limited by pain    [] Patient limited by other medical complications  [] Other:                  PLAN: See eval  [] Continue per plan of care [] Alter current plan (see comments above)  [x] Plan of care initiated [] Hold pending MD visit [] Discharge      Electronically signed by:  Aliyah POLANCO/L [id number]    Note: If patient does not return for scheduled/ recommended follow up visits, this note will serve as a discharge from care along with most recent update on progress.   Phone: [phone]  Fax [phone]

## 2022-03-28 ENCOUNTER — HOSPITAL ENCOUNTER (OUTPATIENT)
Dept: OCCUPATIONAL THERAPY | Age: 72
Setting detail: THERAPIES SERIES
Discharge: HOME | End: 2022-03-28
Payer: MEDICARE

## 2022-03-28 PROCEDURE — 97022 WHIRLPOOL THERAPY: CPT | Performed by: OCCUPATIONAL THERAPIST

## 2022-03-28 PROCEDURE — 97112 NEUROMUSCULAR REEDUCATION: CPT | Performed by: OCCUPATIONAL THERAPIST

## 2022-03-28 PROCEDURE — 97140 MANUAL THERAPY 1/> REGIONS: CPT | Performed by: OCCUPATIONAL THERAPIST

## 2022-03-28 PROCEDURE — 97110 THERAPEUTIC EXERCISES: CPT | Performed by: OCCUPATIONAL THERAPIST

## 2022-03-28 NOTE — FLOWSHEET NOTE
[address] [place], [address] [place] [address] [address]  Phone: [phone] Fax: [phone]     Occupational Therapy Treatment Note/ Progress Report:     Is this a Progress Report:     []  Yes  [x]  No      If Yes:  Date Range for reporting period:  Beginning 3/16/22    Ending    Progress report will be due (10 Rx or 30 days whichever is less): 43    Recertification will be due (POC Duration  / 90 days whichever is less): 22   Patient: Davis Quiros                                    : 1950                                  MRN: [id number]  Referring Physician: Referring Practitioner: Britney Espino                                           Evaluation Date: 3/16/2022                                         Medical Diagnosis Information:  Diagnosis: Z98.890, Z87.81 (ICD-10-CM) - Status post open reduction and internal fixation (ORIF) of fracture;   Treatment Dx:  R wrist pain M25.531                       Date of Injury: 22  Date of Surgery: 22                                  Insurance information:   [facility]     Date of Patient follow up with Physician: 22  Has the plan of care been signed (Y/N):        []  Yes  [x]  No       Visit # Insurance Allowable Auth Required   4 BMN []  Yes []  No    From 3/16/22  to 3/28/2022      Preferred Language for Healthcare:   [x]English       []other:     Date of Patient follow up with Physician: 22     [x]? Patient reported history, allergies, and medications reviewed - see intake form. Preferred Language for Healthcare:   [x]? English       []? other:            RESTRICTIONS/PRECAUTIONS: post op precautions for Distal radius fx ORIF     Latex Allergy:  [x]? No      []? Yes                    Pacemaker:  []? No       []?  Yes         Functional Scale: 56 % (Quick DASH)                                Date assessed:  3/16/2022        C-SSRS Triggered by Intake questionnaire (Past 2 wk assessment):    No, Questionnaire did not trigger screening.         SUBJECTIVE:   Pt reports she is doing better with decreased stiffness. Reports cont limitation with turning door knobs. Patient reported deficits/history of current problem: Pt reports she tripped over log when walking her dog     Pain Scale: 4/10          [x]? Constant                []?Intermittent              []?other:  Pain Location:  wrist  Easing factors: rest  Provocative factors: ROM      OBJECTIVE:   Date:  Hand Dominance:     [x]? Right    []? Left 3/16/2022    3/28/22   Objective Measures:      PAIN 4/10 3/10 intermittent   Quick DASH 56%    Digits tip to DPFC in cm WFL    Thumb ROM WFL    Thumb opposition  WFL:    Thumb Radial/Palmar abd ROM R:  L:    Wrist ROM Ext/Flex R:38/32  L: 47/52   Rad/Uln dev ROM NT:    Forearm ROM  Sup/pron R:  L:50/65   62/78   Elbow ROM Ext/flex [facility]    Shoulder Flex  Shoulder Abd  Shoulder IR/ER    WFL    Edema in cm circumf. MCPJs R:  L:    Edema in cm circumf. Wrist R:  L:     strength in lbs NT:    Pinch Strengthin lbs: lat  R:  L:    Pinch Strength in lbs:  3 point R:  L:       MMT:       Observations:  (including splints, bandages, incisions, scars): Sutures removed; steri strips present           MODALITIES: 3/16/22  3/21/22 3/23/22 3/28/22   Fluidotherapy (11084)  15 10' 12'   Estim (64828/85315)       Paraffin (94718)       US (97251)       Iontophoresis (97134)       Hot Pack 10'      Cold Pack              INTERVENTIONS:       Pt educ A? PROM Forearm/wrist AROM/PROM AROM/PROM Dx info, scar mgt, review HEP     Power web Power web       UE bike 4' 5'      Yellow putty  Rolling, shaping, mallet Yellow putty  Rolling, shaping, mallet             Red flex bar x20 wrist                                                     Manual Therapy (08446)  STM, scar massage. Removal of steri strips STM, scar massage. STM, scar massage.     (IASTM, Dry Needling, manual mobilization)              Splinting *removed metal stay on dorsal side of prefab splint as it appears to be irritating dorsum of hand     Lcode:       Orthotic Mgmt, Subsequent Enc (72166)       Orthotic Mgmt & Training (13662)              Other:                Therapeutic Exercise & NMR:  [] (27389) Provided verbal/tactile cueing for activities related to strengthening, flexibility, endurance, ROM  for improvements in scapular, scapulothoracic and UE control with self care, reaching, carrying, lifting, house/yardwork, driving/computer work.    [] (80959) Provided verbal/tactile cueing for activities related to improving balance, coordination, kinesthetic sense, posture, motor skill, proprioception  to assist with  scapular, scapulothoracic and UE control with self care, reaching, carrying, lifting, house/yardwork, driving/computer work.     Therapeutic Activities & NMR:    [] (86157 or 86202) Provided verbal/tactile cueing for activities related to improving balance, coordination, kinesthetic sense, posture, motor skill, proprioception and motor activation to allow for proper function of scapular, scapulothoracic and UE control with self care, carrying, lifting, driving/computer work    Home Exercise Program:    [] (26171) Reviewed/Progressed HEP activities related to strengthening, flexibility, endurance, ROM of scapular, scapulothoracic and UE control with self care, reaching, carrying, lifting, house/yardwork, driving/computer work  [] (93237) Reviewed/Progressed HEP activities related to improving balance, coordination, kinesthetic sense, posture, motor skill, proprioception of scapular, scapulothoracic and UE control with self care, reaching, carrying, lifting, house/yardwork, driving/computer work      Manual Treatments:  PROM / STM / Oscillations-Mobs:  G-I, II, III, IV (PA's, Inf., Post.)  [] (03153) Provided manual therapy to mobilize soft tissue/joints of cervical/CT, scapular GHJ and UE for the purpose of modulating pain, promoting relaxation,  increasing ROM, reducing/eliminating soft tissue swelling/inflammation/restriction, improving soft tissue extensibility and allowing for proper ROM for normal function with self care, reaching, carrying, lifting, house/yardwork, driving/computer work    ADL Training:  [] (06416) Provided self-care/home management training related to activities of daily living and compensatory training, and/or use of adaptive equipment      Charges:  Timed Code Treatment Minutes: 54'   Total Treatment Minutes: 55   Worker's Comp: Time In/Time Out     [] EVAL (LOW) 50353 (typically 20 minutes face-to-face)    [] EVAL (MOD) 11456 (typically 30 minutes face-to-face)  [] EVAL (HIGH) 10375 (typically 45 minutes face-to-face)  [] OT Re-eval (38267)       [x] Robert [phone] x 1     [] DPVRU(80124)  [x] NMR (99319) x      [] Estim (attended) (56626)   [x] Manual ([phone]) x 1     [] US (22449)  [] TA (81265) x      [] Paraffin (93925)  [] ADL  (87469) x     [] Splint/L code:    [] Estim (unattended) [phone])  [x] Fluidotherapy (71009)  [] Other:    Comorbidities Affecting Functional Performance:     []Anxiety (F41.9)/Depression (F32.9)   []Diabetes Type 1(E10.65) or 2 (E11.65)   []Rheumatoid Arthritis (M05.9)  []Fibromyalgia (M79.7)  []Neuropathy(G60.9)  []Osteoarthritis(M19.91)  []None   []Other:    ASSESSMENT:  Good progress with AROM    GOALS: to be able to use wrist and hand. Patient stated goal: To  Use her right arm    []? Progressing: []? Met: []? Not Met: []? Adjusted     Therapist goals for Patient:   Short Term Goals: To be achieved in: 2 weeks  1. Independent in HEP and progression per patient tolerance, in order to prevent re-injury. []? Progressing: []? Met: []? Not Met: []? Adjusted   2. Patient will have a decrease in pain to facilitate improvement in movement, function, and ADLs as indicated by Functional Deficits. []? Progressing: []? Met: []? Not Met: []?  Adjusted     Long Term Goals to be achieved in 12 weeks (through 6/11/22), including patient directed goals to address patient identified performance deficits:  1) Pt to be independent in graded HEP progression with a good level of effort and compliance. []? Progressing: []? Met: []? Not Met: []? Adjusted   2) Pt to report a score of </= 30 % on the Quick DASH disability questionnaire for increased performance with carrying, moving, and handling objects. []? Progressing: []? Met: []? Not Met: []? Adjusted   3) Pt will demonstrate increased ROM to [facility] for improved independence with self care, home mgt, and vocational responsibilities. []? Progressing: []? Met: []? Not Met: []? Adjusted   4) Pt will demonstrate increased  strength to at least 50% of other hand for improved independence with self care, home mgt, and vocational responsibilities. []? Progressing: []? Met: []? Not Met: []? Adjusted   5) Pt will have a decrease in pain to 2/10 to facilitate self care, home mgt, and vocational responsibilities. .  []? Progressing: []? Met: []? Not Met: []? Adjusted       Overall Progression Towards Functional Goals/Treatment Progress Update:  [] Patient is progressing as expected towards functional goals listed. [] Progression is slowed due to complexities/impairments listed. [] Progression has been slowed due to co-morbidities.   [x] Plan just implemented, too soon to assess goals progression <30 days  [] Goals require adjustment due to lack of progress  [] Patient is not progressing as expected and requires additional follow up with physician  [] All goals are met  [] Other:     Prognosis for POC: [x] Good [] Fair  [] Poor    Patient requires continued skilled intervention: [x] Yes  [] No    Treatment/Activity Tolerance:  [x] Patient able to complete treatment  [] Patient limited by fatigue  [] Patient limited by pain    [] Patient limited by other medical complications  [] Other:                  PLAN: See eval  [] Continue per plan of care [] Alter current plan (see comments above)  [x] Plan of care initiated [] Hold pending MD visit [] Discharge      Electronically signed by:  Alverto Doherty OTR/L [id number]    Note: If patient does not return for scheduled/ recommended follow up visits, this note will serve as a discharge from care along with most recent update on progress.   Phone: [phone]  Fax [phone]

## 2022-03-30 ENCOUNTER — HOSPITAL ENCOUNTER (OUTPATIENT)
Dept: OCCUPATIONAL THERAPY | Age: 72
Setting detail: THERAPIES SERIES
Discharge: HOME | End: 2022-03-30
Payer: MEDICARE

## 2022-03-30 PROCEDURE — 97140 MANUAL THERAPY 1/> REGIONS: CPT | Performed by: OCCUPATIONAL THERAPIST

## 2022-03-30 PROCEDURE — 97530 THERAPEUTIC ACTIVITIES: CPT | Performed by: OCCUPATIONAL THERAPIST

## 2022-03-30 PROCEDURE — 97035 APP MDLTY 1+ULTRASOUND EA 15: CPT | Performed by: OCCUPATIONAL THERAPIST

## 2022-03-30 PROCEDURE — 97022 WHIRLPOOL THERAPY: CPT | Performed by: OCCUPATIONAL THERAPIST

## 2022-03-30 NOTE — PROGRESS NOTES
[address] [place], [address] [place] [address] [address]  Phone: [phone] Fax: [phone]     Occupational Therapy Treatment Note/ Progress Report:     Is this a Progress Report:     [x]  Yes  []  No      If Yes:  Date Range for reporting period:  Beginning 3/16/22    Ending    Progress report will be due (10 Rx or 30 days whichever is less):     Recertification will be due (POC Duration  / 90 days whichever is less): 22   Patient: Mariel Lin                                    : 1950                                  MRN: [id number]  Referring Physician: Referring Practitioner: Lucius Cortes                                           Evaluation Date: 3/16/2022                                         Medical Diagnosis Information:  Diagnosis: Z98.890, Z87.81 (ICD-10-CM) - Status post open reduction and internal fixation (ORIF) of fracture;   Treatment Dx:  R wrist pain M25.531                       Date of Injury: 22  Date of Surgery: 22                                  Insurance information:   [facility]     Date of Patient follow up with Physician: 22  Has the plan of care been signed (Y/N):        []  Yes  [x]  No       Visit # Insurance Allowable Auth Required   5 BMN []  Yes []  No    From 3/16/22  to 3/30/2022      Preferred Language for Healthcare:   [x]English       []other:     Date of Patient follow up with Physician: 22     [x]? Patient reported history, allergies, and medications reviewed - see intake form. Preferred Language for Healthcare:   [x]? English       []? other:            RESTRICTIONS/PRECAUTIONS: post op precautions for Distal radius fx ORIF     Latex Allergy:  [x]? No      []? Yes                    Pacemaker:  []? No       []?  Yes         Functional Scale: 56 % (Quick DASH)                                Date assessed:  3/16/2022        C-SSRS Triggered by Intake questionnaire (Past 2 wk assessment):    No, Questionnaire did not trigger screening.         SUBJECTIVE:   Pt reports no new changes to status   Patient reported deficits/history of current problem: Pt reports she tripped over log when walking her dog     Pain Scale: 4/10          [x]? Constant                []?Intermittent              []?other:  Pain Location:  wrist  Easing factors: rest  Provocative factors: ROM      OBJECTIVE:   Date:  Hand Dominance:     [x]? Right    []? Left 3/16/2022    3/28/22   Objective Measures:      PAIN 4/10 3/10 intermittent   Quick DASH 56%    Digits tip to DPFC in cm WFL    Thumb ROM WFL    Thumb opposition  WFL:    Thumb Radial/Palmar abd ROM R:  L:    Wrist ROM Ext/Flex R:38/32  L: 47/52   Rad/Uln dev ROM NT:    Forearm ROM  Sup/pron R:  L:50/65   62/78   Elbow ROM Ext/flex [facility]    Shoulder Flex  Shoulder Abd  Shoulder IR/ER    WFL    Edema in cm circumf. MCPJs R:  L:    Edema in cm circumf. Wrist R:  L:     strength in lbs NT:    Pinch Strengthin lbs: lat  R:  L:    Pinch Strength in lbs:  3 point R:  L:       MMT:       Observations:  (including splints, bandages, incisions, scars): Sutures removed; steri strips present          ASSESSMENT:  Good progress with AROM    GOALS: to be able to use wrist and hand. Patient stated goal: To  Use her right arm    []? Progressing: []? Met: []? Not Met: []? Adjusted     Therapist goals for Patient:   Short Term Goals: To be achieved in: 2 weeks  1. Independent in HEP and progression per patient tolerance, in order to prevent re-injury. []? Progressing: [x]? Met: []? Not Met: []? Adjusted   2. Patient will have a decrease in pain to facilitate improvement in movement, function, and ADLs as indicated by Functional Deficits. []? Progressing: [x]? Met: []? Not Met: []?  Adjusted     Long Term Goals to be achieved in 12 weeks (through 6/11/22), including patient directed goals to address patient identified performance deficits:  1) Pt to be independent in graded HEP progression with a good level of effort and compliance. [x]? Progressing: []? Met: []? Not Met: []? Adjusted   2) Pt to report a score of </= 30 % on the Quick DASH disability questionnaire for increased performance with carrying, moving, and handling objects. [x]? Progressing: []? Met: []? Not Met: []? Adjusted   3) Pt will demonstrate increased ROM to [facility] for improved independence with self care, home mgt, and vocational responsibilities. [x]? Progressing: []? Met: []? Not Met: []? Adjusted   4) Pt will demonstrate increased  strength to at least 50% of other hand for improved independence with self care, home mgt, and vocational responsibilities. [x]? Progressing: []? Met: []? Not Met: []? Adjusted   5) Pt will have a decrease in pain to 2/10 to facilitate self care, home mgt, and vocational responsibilities. .  [x]? Progressing: []? Met: []? Not Met: []? Adjusted       Overall Progression Towards Functional Goals/Treatment Progress Update:  [] Patient is progressing as expected towards functional goals listed. [] Progression is slowed due to complexities/impairments listed. [] Progression has been slowed due to co-morbidities.   [x] Plan just implemented, too soon to assess goals progression <30 days  [] Goals require adjustment due to lack of progress  [] Patient is not progressing as expected and requires additional follow up with physician  [] All goals are met  [] Other:     Prognosis for POC: [x] Good [] Fair  [] Poor    Patient requires continued skilled intervention: [x] Yes  [] No    Treatment/Activity Tolerance:  [x] Patient able to complete treatment  [] Patient limited by fatigue  [] Patient limited by pain    [] Patient limited by other medical complications  [] Other:                  PLAN: See eval  [] Continue per plan of care [] Alter current plan (see comments above)  [x] Plan of care initiated [] Hold pending MD visit [] Discharge      Electronically signed by:  Lissett King Jonathan OTR/L [id number]    Note: If patient does not return for scheduled/ recommended follow up visits, this note will serve as a discharge from care along with most recent update on progress.   Phone: [phone]  Fax [phone]

## 2022-03-30 NOTE — FLOWSHEET NOTE
[address] [place], [address] [place] [address] [address]  Phone: [phone] Fax: [phone]     Occupational Therapy Treatment Note/ Progress Report:     Is this a Progress Report:     []  Yes  [x]  No      If Yes:  Date Range for reporting period:  Beginning 3/16/22    Ending    Progress report will be due (10 Rx or 30 days whichever is less):     Recertification will be due (POC Duration  / 90 days whichever is less): 22   Patient: Marlen Fulton                                    : 1950                                  MRN: [id number]  Referring Physician: Referring Practitioner: Abdullahi Adam                                           Evaluation Date: 3/16/2022                                         Medical Diagnosis Information:  Diagnosis: Z98.890, Z87.81 (ICD-10-CM) - Status post open reduction and internal fixation (ORIF) of fracture;   Treatment Dx:  R wrist pain M25.531                       Date of Injury: 22  Date of Surgery: 22                                  Insurance information:  [facility] and [facility]     Date of Patient follow up with Physician: 22  Has the plan of care been signed (Y/N):        []  Yes  [x]  No       Visit # Insurance Allowable Auth Required   5 BMN []  Yes []  No    From 3/16/22  to 3/30/2022      Preferred Language for Healthcare:   [x]English       []other:     Date of Patient follow up with Physician: 22     [x]? Patient reported history, allergies, and medications reviewed - see intake form. Preferred Language for Healthcare:   [x]? English       []? other:            RESTRICTIONS/PRECAUTIONS: post op precautions for Distal radius fx ORIF     Latex Allergy:  [x]? No      []? Yes                    Pacemaker:  []? No       []?  Yes         Functional Scale: 56 % (Quick DASH)                                Date assessed:  3/16/2022        C-SSRS Triggered by Intake questionnaire (Past 2 wk assessment):    No, Questionnaire did not trigger screening.         SUBJECTIVE:   Pt reports no new changes to status   Patient reported deficits/history of current problem: Pt reports she tripped over log when walking her dog     Pain Scale: 4/10          [x]? Constant                []?Intermittent              []?other:  Pain Location:  wrist  Easing factors: rest  Provocative factors: ROM      OBJECTIVE:   Date:  Hand Dominance:     [x]? Right    []? Left 3/16/2022    3/28/22   Objective Measures:      PAIN 4/10 3/10 intermittent   Quick DASH 56%    Digits tip to DPFC in cm WFL    Thumb ROM WFL    Thumb opposition  WFL:    Thumb Radial/Palmar abd ROM R:  L:    Wrist ROM Ext/Flex R:38/32  L: 47/52   Rad/Uln dev ROM NT:    Forearm ROM  Sup/pron R:  L:50/65   62/78   Elbow ROM Ext/flex [facility]    Shoulder Flex  Shoulder Abd  Shoulder IR/ER    WFL    Edema in cm circumf. MCPJs R:  L:    Edema in cm circumf. Wrist R:  L:     strength in lbs NT:    Pinch Strengthin lbs: lat  R:  L:    Pinch Strength in lbs:  3 point R:  L:       MMT:       Observations:  (including splints, bandages, incisions, scars): Sutures removed; steri strips present           MODALITIES: 3/16/22  3/21/22 3/23/22 3/28/22 3/30/22   Fluidotherapy (16875)  15 10' 12' 10'   Estim (33363/09783)        Paraffin (13300)        US (78419)     100% @ 1.5 8' at scar   Iontophoresis (66182)        Hot Pack 10'       Cold Pack                INTERVENTIONS:        Pt educ A? PROM Forearm/wrist AROM/PROM AROM/PROM Dx info, scar mgt, review HEP      Power web Power web        UE bike 4' 5' 5'      Yellow putty  Rolling, shaping, mallet Yellow putty  Rolling, shaping, mallet Yellow putty  Rolling, pulling              Red flex bar x20 wrist  Red flex bar x20 wrist and forearm                                                           Manual Therapy (12114)  STM, scar massage. Removal of steri strips STM, scar massage. STM, scar massage.   STM, scar massage; jt mob   (IASTM, Dry Needling, manual mobilization)                Splinting  *removed metal stay on dorsal side of prefab splint as it appears to be irritating dorsum of hand      Lcode:        Orthotic Mgmt, Subsequent Enc (34115)        Orthotic Mgmt & Training (53290)                Other:                  Therapeutic Exercise & NMR:  [] (11002) Provided verbal/tactile cueing for activities related to strengthening, flexibility, endurance, ROM  for improvements in scapular, scapulothoracic and UE control with self care, reaching, carrying, lifting, house/yardwork, driving/computer work.    [] (92699) Provided verbal/tactile cueing for activities related to improving balance, coordination, kinesthetic sense, posture, motor skill, proprioception  to assist with  scapular, scapulothoracic and UE control with self care, reaching, carrying, lifting, house/yardwork, driving/computer work.     Therapeutic Activities & NMR:    [] (58069 or 80306) Provided verbal/tactile cueing for activities related to improving balance, coordination, kinesthetic sense, posture, motor skill, proprioception and motor activation to allow for proper function of scapular, scapulothoracic and UE control with self care, carrying, lifting, driving/computer work    Home Exercise Program:    [] (05945) Reviewed/Progressed HEP activities related to strengthening, flexibility, endurance, ROM of scapular, scapulothoracic and UE control with self care, reaching, carrying, lifting, house/yardwork, driving/computer work  [] (33676) Reviewed/Progressed HEP activities related to improving balance, coordination, kinesthetic sense, posture, motor skill, proprioception of scapular, scapulothoracic and UE control with self care, reaching, carrying, lifting, house/yardwork, driving/computer work      Manual Treatments:  PROM / STM / Oscillations-Mobs:  G-I, II, III, IV (PA's, Inf., Post.)  [] (99886) Provided manual therapy to mobilize soft tissue/joints of cervical/CT, scapular GHJ and UE for the purpose of modulating pain, promoting relaxation,  increasing ROM, reducing/eliminating soft tissue swelling/inflammation/restriction, improving soft tissue extensibility and allowing for proper ROM for normal function with self care, reaching, carrying, lifting, house/yardwork, driving/computer work    ADL Training:  [] (53961) Provided self-care/home management training related to activities of daily living and compensatory training, and/or use of adaptive equipment      Charges:  Timed Code Treatment Minutes: 45   Total Treatment Minutes: 60   Worker's Comp: Time In/Time Out     [] EVAL (LOW) 29366 (typically 20 minutes face-to-face)    [] EVAL (MOD) 07669 (typically 30 minutes face-to-face)  [] EVAL (HIGH) 05642 (typically 45 minutes face-to-face)  [] OT Re-eval (78781)       [x] Robert [phone]) x 1     [] QPDGX(95003)  [] NMR (77081) x      [] Estim (attended) (29799)   [x] Manual ([phone]) x 1     [x] US (06771)  [] TA (78227) x      [] Paraffin (18982)  [] ADL  (29482) x     [] Splint/L code:    [] Estim (unattended) (22 469751)  [x] Fluidotherapy (11209)  [] Other:    Comorbidities Affecting Functional Performance:     []Anxiety (F41.9)/Depression (F32.9)   []Diabetes Type 1(E10.65) or 2 (E11.65)   []Rheumatoid Arthritis (M05.9)  []Fibromyalgia (M79.7)  []Neuropathy(G60.9)  []Osteoarthritis(M19.91)  []None   []Other:    ASSESSMENT:  Good progress with AROM    GOALS: to be able to use wrist and hand. Patient stated goal: To  Use her right arm    []? Progressing: []? Met: []? Not Met: []? Adjusted     Therapist goals for Patient:   Short Term Goals: To be achieved in: 2 weeks  1. Independent in HEP and progression per patient tolerance, in order to prevent re-injury. []? Progressing: []? Met: []? Not Met: []? Adjusted   2. Patient will have a decrease in pain to facilitate improvement in movement, function, and ADLs as indicated by Functional Deficits. []?  Progressing: []? Met: []? Not Met: []? Adjusted     Long Term Goals to be achieved in 12 weeks (through 6/11/22), including patient directed goals to address patient identified performance deficits:  1) Pt to be independent in graded HEP progression with a good level of effort and compliance. []? Progressing: []? Met: []? Not Met: []? Adjusted   2) Pt to report a score of </= 30 % on the Quick DASH disability questionnaire for increased performance with carrying, moving, and handling objects. []? Progressing: []? Met: []? Not Met: []? Adjusted   3) Pt will demonstrate increased ROM to [facility] for improved independence with self care, home mgt, and vocational responsibilities. []? Progressing: []? Met: []? Not Met: []? Adjusted   4) Pt will demonstrate increased  strength to at least 50% of other hand for improved independence with self care, home mgt, and vocational responsibilities. []? Progressing: []? Met: []? Not Met: []? Adjusted   5) Pt will have a decrease in pain to 2/10 to facilitate self care, home mgt, and vocational responsibilities. .  []? Progressing: []? Met: []? Not Met: []? Adjusted       Overall Progression Towards Functional Goals/Treatment Progress Update:  [] Patient is progressing as expected towards functional goals listed. [] Progression is slowed due to complexities/impairments listed. [] Progression has been slowed due to co-morbidities.   [x] Plan just implemented, too soon to assess goals progression <30 days  [] Goals require adjustment due to lack of progress  [] Patient is not progressing as expected and requires additional follow up with physician  [] All goals are met  [] Other:     Prognosis for POC: [x] Good [] Fair  [] Poor    Patient requires continued skilled intervention: [x] Yes  [] No    Treatment/Activity Tolerance:  [x] Patient able to complete treatment  [] Patient limited by fatigue  [] Patient limited by pain    [] Patient limited by other medical complications  [] Other: PLAN: See eval  [] Continue per plan of care [] Alter current plan (see comments above)  [x] Plan of care initiated [] Hold pending MD visit [] Discharge      Electronically signed by:  Jermaine Harrell OTR/L [id number]    Note: If patient does not return for scheduled/ recommended follow up visits, this note will serve as a discharge from care along with most recent update on progress.   Phone: [phone]  Fax [phone]

## 2022-04-04 ENCOUNTER — HOSPITAL ENCOUNTER (OUTPATIENT)
Dept: OCCUPATIONAL THERAPY | Age: 72
Setting detail: THERAPIES SERIES
Discharge: HOME | End: 2022-04-04
Payer: MEDICARE

## 2022-04-04 PROCEDURE — 97140 MANUAL THERAPY 1/> REGIONS: CPT | Performed by: OCCUPATIONAL THERAPIST

## 2022-04-04 PROCEDURE — 97022 WHIRLPOOL THERAPY: CPT | Performed by: OCCUPATIONAL THERAPIST

## 2022-04-04 PROCEDURE — 97110 THERAPEUTIC EXERCISES: CPT | Performed by: OCCUPATIONAL THERAPIST

## 2022-04-04 PROCEDURE — 97112 NEUROMUSCULAR REEDUCATION: CPT | Performed by: OCCUPATIONAL THERAPIST

## 2022-04-04 NOTE — FLOWSHEET NOTE
[address] [place] [address],[address] [place], [address] [place] [address] [address]  Phone: [phone] Fax: [phone]     Occupational Therapy Treatment Note/ Progress Report:     Is this a Progress Report:     []  Yes  [x]  No      If Yes:  Date Range for reporting period:  Beginning 3/16/22    Ending    Progress report will be due (10 Rx or 30 days whichever is less): 99    Recertification will be due (POC Duration  / 90 days whichever is less): 22   Patient: Nola Kohler                                    : 1950                                  MRN: [id number]  Referring Physician: Referring Practitioner: Amberly Saavedra                                           Evaluation Date: 3/16/2022                                         Medical Diagnosis Information:  Diagnosis: Z98.890, Z87.81 (ICD-10-CM) - Status post open reduction and internal fixation (ORIF) of fracture;   Treatment Dx:  R wrist pain M25.531                       Date of Injury: 22  Date of Surgery: 22                                  Insurance information:  [facility] and [facility]     Date of Patient follow up with Physician: 22  Has the plan of care been signed (Y/N):        []  Yes  [x]  No       Visit # Insurance Allowable Auth Required   6 BMN []  Yes []  No    From 3/16/22  to 2022      Preferred Language for Healthcare:   [x]English       []other:     Date of Patient follow up with Physician: 22     [x]? Patient reported history, allergies, and medications reviewed - see intake form. Preferred Language for Healthcare:   [x]? English       []? other:            RESTRICTIONS/PRECAUTIONS: post op precautions for Distal radius fx ORIF     Latex Allergy:  [x]? No      []? Yes                    Pacemaker:  []? No       []?  Yes         Functional Scale: 56 % (Quick DASH)                                Date assessed:  3/16/2022        C-SSRS Triggered by Intake questionnaire (Past 2 wk assessment):    No, Questionnaire did not trigger screening.         SUBJECTIVE:     Pt reports spontaneous pain at dorsum of hand. OT palpated radial nerve at radial tunnel which was unremarkable to Pt. Patient reported deficits/history of current problem: Pt reports she tripped over log when walking her dog     Pain Scale: 4/10          [x]? Constant                []?Intermittent              []?other:  Pain Location:  wrist  Easing factors: rest  Provocative factors: ROM      OBJECTIVE:   Date:  Hand Dominance:     [x]? Right    []? Left 3/16/2022    3/28/22   Objective Measures:      PAIN 4/10 3/10 intermittent   Quick DASH 56%    Digits tip to DPFC in cm WFL    Thumb ROM WFL    Thumb opposition  WFL:    Thumb Radial/Palmar abd ROM R:  L:    Wrist ROM Ext/Flex R:38/32  L: 47/52   Rad/Uln dev ROM NT:    Forearm ROM  Sup/pron R:  L:50/65   62/78   Elbow ROM Ext/flex [facility]    Shoulder Flex  Shoulder Abd  Shoulder IR/ER    WFL    Edema in cm circumf. MCPJs R:  L:    Edema in cm circumf. Wrist R:  L:     strength in lbs NT:    Pinch Strengthin lbs: lat  R:  L:    Pinch Strength in lbs:  3 point R:  L:       MMT:       Observations:  (including splints, bandages, incisions, scars): Sutures removed; steri strips present           MODALITIES: 3/16/22  3/21/22 3/23/22 3/28/22 3/30/22 4/4/22   Fluidotherapy (17644)  15 10' 12' 10' 12'   Estim (87975/05632)         Paraffin (56149)         US (00941)     100% @ 1.5 8' at scar    Iontophoresis (76978)         Hot Pack 10'        Cold Pack                  INTERVENTIONS:         Pt educ A? PROM Forearm/wrist AROM/PROM AROM/PROM Dx info, scar mgt, review HEP       Power web Power web         UE bike 4' 5' 5' 5'      Yellow putty  Rolling, shaping, mallet Yellow putty  Rolling, shaping, mallet Yellow putty  Rolling, pulling Yellow putty  Rolling, pulling; mallet               Red flex bar x20 wrist  Red flex bar x20 wrist and forearm Red flex bar x20 wrist and forearm Verbal cues with exercises                                       Manual Therapy (17967)  STM, scar massage. Removal of steri strips STM, scar massage. STM, scar massage. STM, scar massage; jt mob STM, scar massage; jt mob   (IASTM, Dry Needling, manual mobilization)                  Splinting  *removed metal stay on dorsal side of prefab splint as it appears to be irritating dorsum of hand       Lcode:         Orthotic Mgmt, Subsequent Enc (26540)         Orthotic Mgmt & Training (86637)                  Other:                    Therapeutic Exercise & NMR:  [] (95916) Provided verbal/tactile cueing for activities related to strengthening, flexibility, endurance, ROM  for improvements in scapular, scapulothoracic and UE control with self care, reaching, carrying, lifting, house/yardwork, driving/computer work.    [] (86859) Provided verbal/tactile cueing for activities related to improving balance, coordination, kinesthetic sense, posture, motor skill, proprioception  to assist with  scapular, scapulothoracic and UE control with self care, reaching, carrying, lifting, house/yardwork, driving/computer work.     Therapeutic Activities & NMR:    [] (70267 or 93889) Provided verbal/tactile cueing for activities related to improving balance, coordination, kinesthetic sense, posture, motor skill, proprioception and motor activation to allow for proper function of scapular, scapulothoracic and UE control with self care, carrying, lifting, driving/computer work    Home Exercise Program:    [] (53170) Reviewed/Progressed HEP activities related to strengthening, flexibility, endurance, ROM of scapular, scapulothoracic and UE control with self care, reaching, carrying, lifting, house/yardwork, driving/computer work  [] (66158) Reviewed/Progressed HEP activities related to improving balance, coordination, kinesthetic sense, posture, motor skill, proprioception of scapular, scapulothoracic and UE control with self care, reaching, carrying, lifting, house/yardwork, driving/computer work      Manual Treatments:  PROM / STM / Oscillations-Mobs:  G-I, II, III, IV (PA's, Inf., Post.)  [] (02048) Provided manual therapy to mobilize soft tissue/joints of cervical/CT, scapular GHJ and UE for the purpose of modulating pain, promoting relaxation,  increasing ROM, reducing/eliminating soft tissue swelling/inflammation/restriction, improving soft tissue extensibility and allowing for proper ROM for normal function with self care, reaching, carrying, lifting, house/yardwork, driving/computer work    ADL Training:  [] (38986) Provided self-care/home management training related to activities of daily living and compensatory training, and/or use of adaptive equipment      Charges:  Timed Code Treatment Minutes: 37'   Total Treatment Minutes: 54'   Worker's Comp: Time In/Time Out     [] EVAL (LOW) 70260 (typically 20 minutes face-to-face)    [] EVAL (MOD) 52319 (typically 30 minutes face-to-face)  [] EVAL (HIGH) [phone] (typically 45 minutes face-to-face)  [] OT Re-eval (01036)       [x] Robert ([address]) x 1     [] TITNF(75161)  [x] NMR (87952) x  1    [] Estim (attended) (20066)   [x] Manual ([id number]) x 1     [] US (48825)  [] TA () x      [] Paraffin (94317)  [] ADL  ([phone]) x     [] Splint/L code:    [] Estim (unattended) ([id number])  [x] Fluidotherapy (74689)  [] Other:    Comorbidities Affecting Functional Performance:     []Anxiety (F41.9)/Depression (F32.9)   []Diabetes Type 1(E10.65) or 2 (E11.65)   []Rheumatoid Arthritis (M05.9)  []Fibromyalgia (M79.7)  []Neuropathy(G60.9)  []Osteoarthritis(M19.91)  []None   []Other:    ASSESSMENT:  Good progress with AROM    GOALS: to be able to use wrist and hand. Patient stated goal: To  Use her right arm    []? Progressing: []? Met: []? Not Met: []? Adjusted     Therapist goals for Patient:   Short Term Goals: To be achieved in: 2 weeks  1.  Independent in HEP and progression per patient tolerance, in order to prevent re-injury. []? Progressing: []? Met: []? Not Met: []? Adjusted   2. Patient will have a decrease in pain to facilitate improvement in movement, function, and ADLs as indicated by Functional Deficits. []? Progressing: []? Met: []? Not Met: []? Adjusted     Long Term Goals to be achieved in 12 weeks (through 6/11/22), including patient directed goals to address patient identified performance deficits:  1) Pt to be independent in graded HEP progression with a good level of effort and compliance. []? Progressing: []? Met: []? Not Met: []? Adjusted   2) Pt to report a score of </= 30 % on the Quick DASH disability questionnaire for increased performance with carrying, moving, and handling objects. []? Progressing: []? Met: []? Not Met: []? Adjusted   3) Pt will demonstrate increased ROM to [facility] for improved independence with self care, home mgt, and vocational responsibilities. []? Progressing: []? Met: []? Not Met: []? Adjusted   4) Pt will demonstrate increased  strength to at least 50% of other hand for improved independence with self care, home mgt, and vocational responsibilities. []? Progressing: []? Met: []? Not Met: []? Adjusted   5) Pt will have a decrease in pain to 2/10 to facilitate self care, home mgt, and vocational responsibilities. .  []? Progressing: []? Met: []? Not Met: []? Adjusted       Overall Progression Towards Functional Goals/Treatment Progress Update:  [] Patient is progressing as expected towards functional goals listed. [] Progression is slowed due to complexities/impairments listed. [] Progression has been slowed due to co-morbidities.   [x] Plan just implemented, too soon to assess goals progression <30 days  [] Goals require adjustment due to lack of progress  [] Patient is not progressing as expected and requires additional follow up with physician  [] All goals are met  [] Other:     Prognosis for POC: [x] Good [] Fair  [] Poor    Patient requires continued skilled intervention: [x] Yes  [] No    Treatment/Activity Tolerance:  [x] Patient able to complete treatment  [] Patient limited by fatigue  [] Patient limited by pain    [] Patient limited by other medical complications  [] Other:                  PLAN: See eval  [] Continue per plan of care [] Alter current plan (see comments above)  [x] Plan of care initiated [] Hold pending MD visit [] Discharge      Electronically signed by:  Pino POLANCO/RAHEEM [id number]    Note: If patient does not return for scheduled/ recommended follow up visits, this note will serve as a discharge from care along with most recent update on progress.   Phone: [phone]  Fax [phone]

## 2022-04-06 ENCOUNTER — HOSPITAL ENCOUNTER (OUTPATIENT)
Dept: OCCUPATIONAL THERAPY | Age: 72
Setting detail: THERAPIES SERIES
Discharge: HOME | End: 2022-04-06
Payer: MEDICARE

## 2022-04-06 PROCEDURE — 97022 WHIRLPOOL THERAPY: CPT | Performed by: OCCUPATIONAL THERAPIST

## 2022-04-06 PROCEDURE — 97140 MANUAL THERAPY 1/> REGIONS: CPT | Performed by: OCCUPATIONAL THERAPIST

## 2022-04-06 PROCEDURE — 97110 THERAPEUTIC EXERCISES: CPT | Performed by: OCCUPATIONAL THERAPIST

## 2022-04-06 PROCEDURE — 97112 NEUROMUSCULAR REEDUCATION: CPT | Performed by: OCCUPATIONAL THERAPIST

## 2022-04-06 NOTE — FLOWSHEET NOTE
2 [address] 989 [facility], [address] [place] [address] [address]  Phone: [phone] Fax: [phone]     Occupational Therapy Treatment Note/ Progress Report:     Is this a Progress Report:     [x]  Yes  []  No      If Yes:  Date Range for reporting period:  Beginning 3/16/22    Ending    Progress report will be due (10 Rx or 30 days whichever is less):     Recertification will be due (POC Duration  / 90 days whichever is less): 22   Patient: Damion Hutchins                                    : 1950                                  MRN: [id number]  Referring Physician: Referring Practitioner: Brittaney Hawley                                           Evaluation Date: 3/16/2022                                         Medical Diagnosis Information:  Diagnosis: Z98.890, Z87.81 (ICD-10-CM) - Status post open reduction and internal fixation (ORIF) of fracture;   Treatment Dx:  R wrist pain M25.531                       Date of Injury: 22  Date of Surgery: 22                                  Insurance information:  [facility] and [facility]     Date of Patient follow up with Physician: 22  Has the plan of care been signed (Y/N):        []  Yes  [x]  No       Visit # Insurance Allowable Auth Required   7 BMN []  Yes []  No    From 3/16/22  to 2022      Preferred Language for Healthcare:   [x]English       []other:     Date of Patient follow up with Physician: 22     [x]? Patient reported history, allergies, and medications reviewed - see intake form. Preferred Language for Healthcare:   [x]? English       []? other:            RESTRICTIONS/PRECAUTIONS: post op precautions for Distal radius fx ORIF     Latex Allergy:  [x]? No      []? Yes                    Pacemaker:  []? No       []?  Yes         Functional Scale: 56 % (Quick DASH)                                Date assessed:  3/16/2022        C-SSRS Triggered by Intake questionnaire (Past 2 wk assessment):    No, Questionnaire did not trigger screening.         SUBJECTIVE:     Pt reports she is feeling less soreness than last visit   Patient reported deficits/history of current problem: Pt reports she tripped over log when walking her dog        OBJECTIVE:   Date:  Hand Dominance:     [x]? Right    []? Left 3/16/2022    3/28/22    Objective Measures:       PAIN 4/10 3/10 intermittent    Quick DASH 56%     Digits tip to DPFC in cm WFL     Thumb ROM WFL     Thumb opposition  WFL:     Thumb Radial/Palmar abd ROM R:  L:     Wrist ROM Ext/Flex R:38/32  L: 47/52    Rad/Uln dev ROM NT:     Forearm ROM  Sup/pron R:  L:50/65   62/78    Elbow ROM Ext/flex [facility]     Shoulder Flex  Shoulder Abd  Shoulder IR/ER    WFL     Edema in cm circumf. MCPJs R:  L:     Edema in cm circumf. Wrist R:  L:      strength in lbs NT:     Pinch Strengthin lbs: lat  R:  L:     Pinch Strength in lbs:  3 point R:  L:        MMT:        Observations:  (including splints, bandages, incisions, scars): Sutures removed; steri strips present            MODALITIES: 3/16/22  3/21/22 3/23/22 3/28/22 3/30/22 4/4/22 4/6/22   Fluidotherapy (70316)  15 10' 12' 10' 12' 12'   Estim (09206/12690)          Paraffin (79695)          US (48163)     100% @ 1.5 8' at scar  8'   Iontophoresis (63482)          Hot Pack 10'         Cold Pack                    INTERVENTIONS:          Pt educ A? PROM Forearm/wrist AROM/PROM AROM/PROM Dx info, scar mgt, review HEP        Power web Power web          UE bike 4' 5' 5' 5' #2 6'      Yellow putty  Rolling, shaping, mallet Yellow putty  Rolling, shaping, mallet Yellow putty  Rolling, pulling Yellow putty  Rolling, pulling; mallet Yellow putty  Rolling, pulling; malle                Red flex bar x20 wrist  Red flex bar x20 wrist and forearm Red flex bar x20 wrist and forearm Red flex bar x20 wrist and forearm                             Verbal cues with exercises Verbal cues with exercises          Power  #1 with sponge                                  Manual Therapy (74582)  STM, scar massage. Removal of steri strips STM, scar massage. STM, scar massage. STM, scar massage; jt mob STM, scar massage; jt mob STM, scar massage; jt mob   (IASTM, Dry Needling, manual mobilization)                    Splinting  *removed metal stay on dorsal side of prefab splint as it appears to be irritating dorsum of hand        Lcode:          Orthotic Mgmt, Subsequent Enc (96047)          Orthotic Mgmt & Training (21518)                    Other:                      Therapeutic Exercise & NMR:  [] (97185) Provided verbal/tactile cueing for activities related to strengthening, flexibility, endurance, ROM  for improvements in scapular, scapulothoracic and UE control with self care, reaching, carrying, lifting, house/yardwork, driving/computer work.    [] (21743) Provided verbal/tactile cueing for activities related to improving balance, coordination, kinesthetic sense, posture, motor skill, proprioception  to assist with  scapular, scapulothoracic and UE control with self care, reaching, carrying, lifting, house/yardwork, driving/computer work.     Therapeutic Activities & NMR:    [] (90735 or 67342) Provided verbal/tactile cueing for activities related to improving balance, coordination, kinesthetic sense, posture, motor skill, proprioception and motor activation to allow for proper function of scapular, scapulothoracic and UE control with self care, carrying, lifting, driving/computer work    Home Exercise Program:    [] (01697) Reviewed/Progressed HEP activities related to strengthening, flexibility, endurance, ROM of scapular, scapulothoracic and UE control with self care, reaching, carrying, lifting, house/yardwork, driving/computer work  [] (24710) Reviewed/Progressed HEP activities related to improving balance, coordination, kinesthetic sense, posture, motor skill, proprioception of scapular, scapulothoracic and UE control with self care, reaching, carrying, lifting, house/yardwork, driving/computer work      Manual Treatments:  PROM / STM / Oscillations-Mobs:  G-I, II, III, IV (PA's, Inf., Post.)  [] (58517) Provided manual therapy to mobilize soft tissue/joints of cervical/CT, scapular GHJ and UE for the purpose of modulating pain, promoting relaxation,  increasing ROM, reducing/eliminating soft tissue swelling/inflammation/restriction, improving soft tissue extensibility and allowing for proper ROM for normal function with self care, reaching, carrying, lifting, house/yardwork, driving/computer work    ADL Training:  [] (50511) Provided self-care/home management training related to activities of daily living and compensatory training, and/or use of adaptive equipment      Charges:  Timed Code Treatment Minutes: 48'   Total Treatment Minutes: 61'   Worker's Comp: Time In/Time Out     [] EVAL (LOW) 22 [id number] (typically 20 minutes face-to-face)    [] EVAL (MOD) 47355 (typically 30 minutes face-to-face)  [] EVAL (HIGH) [phone] (typically 45 minutes face-to-face)  [] OT Re-eval (74455)       [x] Robert ([id number]) x 1     [] CZZOO(85130)  [x] NMR (76451) x  1    [] Estim (attended) (92300)   [x] Manual ([id number]) x 1     [] US (36531)  [] TA () x      [] Paraffin (02507)  [] ADL  (88 [phone] 60) x     [] Splint/L code:    [] Estim (unattended) ([id number])  [x] Fluidotherapy (07699)  [] Other:    Comorbidities Affecting Functional Performance:     []Anxiety (F41.9)/Depression (F32.9)   []Diabetes Type 1(E10.65) or 2 (E11.65)   []Rheumatoid Arthritis (M05.9)  []Fibromyalgia (M79.7)  []Neuropathy(G60.9)  []Osteoarthritis(M19.91)  []None   []Other:    ASSESSMENT:   Pt progressing with AROM and functional use    GOALS: to be able to use wrist and hand. Patient stated goal: To  Use her right arm    []? Progressing: []? Met: []? Not Met: []? Adjusted     Therapist goals for Patient:   Short Term Goals: To be achieved in: 2 weeks  1.  Independent in HEP and progression per patient tolerance, in order to prevent re-injury. []? Progressing: [x]? Met: []? Not Met: []? Adjusted   2. Patient will have a decrease in pain to facilitate improvement in movement, function, and ADLs as indicated by Functional Deficits. []? Progressing: [x]? Met: []? Not Met: []? Adjusted     Long Term Goals to be achieved in 12 weeks (through 6/11/22), including patient directed goals to address patient identified performance deficits:  1) Pt to be independent in graded HEP progression with a good level of effort and compliance. [x]? Progressing: []? Met: []? Not Met: []? Adjusted   2) Pt to report a score of </= 30 % on the Quick DASH disability questionnaire for increased performance with carrying, moving, and handling objects. [x]? Progressing: []? Met: []? Not Met: []? Adjusted   3) Pt will demonstrate increased ROM to [facility] for improved independence with self care, home mgt, and vocational responsibilities. [x]? Progressing: []? Met: []? Not Met: []? Adjusted   4) Pt will demonstrate increased  strength to at least 50% of other hand for improved independence with self care, home mgt, and vocational responsibilities. [x]? Progressing: []? Met: []? Not Met: []? Adjusted   5) Pt will have a decrease in pain to 2/10 to facilitate self care, home mgt, and vocational responsibilities. .  [x]? Progressing: []? Met: []? Not Met: []? Adjusted       Overall Progression Towards Functional Goals/Treatment Progress Update:  [] Patient is progressing as expected towards functional goals listed. [] Progression is slowed due to complexities/impairments listed. [] Progression has been slowed due to co-morbidities.   [x] Plan just implemented, too soon to assess goals progression <30 days  [] Goals require adjustment due to lack of progress  [] Patient is not progressing as expected and requires additional follow up with physician  [] All goals are met  [] Other:     Prognosis for POC: [x] Good [] Fair  [] Poor    Patient requires continued skilled intervention: [x] Yes  [] No    Treatment/Activity Tolerance:  [x] Patient able to complete treatment  [] Patient limited by fatigue  [] Patient limited by pain    [] Patient limited by other medical complications  [] Other:                  PLAN: See eval  [] Continue per plan of care [] Alter current plan (see comments above)  [x] Plan of care initiated [] Hold pending MD visit [] Discharge      Electronically signed by:  Juany Lemus OTR/L [id number]    Note: If patient does not return for scheduled/ recommended follow up visits, this note will serve as a discharge from care along with most recent update on progress.   Phone: [phone]  Fax [phone]

## 2022-04-08 ENCOUNTER — OFFICE VISIT (OUTPATIENT)
Dept: ORTHOPEDIC SURGERY | Age: 72
End: 2022-04-08

## 2022-04-08 VITALS — BODY MASS INDEX: 24.55 KG/M2 | WEIGHT: 130 LBS | HEIGHT: 61 IN

## 2022-04-08 DIAGNOSIS — Z87.81 STATUS POST OPEN REDUCTION AND INTERNAL FIXATION (ORIF) OF FRACTURE: Primary | ICD-10-CM

## 2022-04-08 DIAGNOSIS — Z98.890 STATUS POST OPEN REDUCTION AND INTERNAL FIXATION (ORIF) OF FRACTURE: Primary | ICD-10-CM

## 2022-04-08 PROCEDURE — 99024 POSTOP FOLLOW-UP VISIT: CPT | Performed by: ORTHOPAEDIC SURGERY

## 2022-04-09 NOTE — PROGRESS NOTES
ORTHOPAEDIC SURGERY FOLLOWUP VISIT     CHIEF COMPLAINT:  Right wrist     DATE OF INJURY: 2/16/2022  DIAGNOSIS: Right distal radius fracture  DATE OF SURGERY: 2/24/2022, ORIF right distal radius fracture     HISTORY OF PRESENT ILLNESS:  69-year-old right-hand-dominant female presents for evaluation of a right distal radius fracture. She is 6 weeks from ORIF. She indicates that her pain is 0 out of 10. She states that her pain has improved tremendously after surgery. She denies numbness or tingling. She has not had any fevers, chills, or night sweats. She has been compliant with weightbearing restrictions. She has had some stiffness to her fingers. She is attending occupational therapy and feels this is going well.     PHYSICAL EXAM:  General: Well-appearing female of stated age. No acute distress. Right upper extremity: Presents without immobilization. Incision appears pristine. Is well-healed. There is no signs of dehiscence. No open areas. No surrounding erythema. Bruising resolved. There is minimal tenderness to palpation about the wound. She has 90 degrees supination and nearly 90 degrees pronation. There is mild limitation to radial/ulnar deviation. There is no significant pain at extremes of motion. Flexion/extension does not cause significant pain. She is able to make a 100% fist.  Sensation is intact to light touch in median, radial, ulnar, and axillary distributions. Motor function is intact to AIN, PIN, ulnar motor nerves. There is a strong palpable radial pulse, and brisk capillary refill to the fingers. Compartments are soft and compressible     RADIOGRAPHIC EXAM:  4 views of the right wrist including PA, oblique, lateral, and 20 degree inclined lateral x-rays demonstrate maintained alignment of distal radius fracture with evidence of interval healing. There is maturing callus about the radial and ulnar aspects of the distal radius.   There is 1 mm of ulnar positive variance as

## 2022-04-11 ENCOUNTER — HOSPITAL ENCOUNTER (OUTPATIENT)
Dept: OCCUPATIONAL THERAPY | Age: 72
Setting detail: THERAPIES SERIES
Discharge: HOME | End: 2022-04-11
Payer: MEDICARE

## 2022-04-11 PROCEDURE — 97112 NEUROMUSCULAR REEDUCATION: CPT | Performed by: OCCUPATIONAL THERAPIST

## 2022-04-11 PROCEDURE — 97110 THERAPEUTIC EXERCISES: CPT | Performed by: OCCUPATIONAL THERAPIST

## 2022-04-11 PROCEDURE — 97140 MANUAL THERAPY 1/> REGIONS: CPT | Performed by: OCCUPATIONAL THERAPIST

## 2022-04-11 PROCEDURE — 97022 WHIRLPOOL THERAPY: CPT | Performed by: OCCUPATIONAL THERAPIST

## 2022-04-11 NOTE — FLOWSHEET NOTE
2 [address] [place], [address] [place] [address] [address]  Phone: [phone] Fax: [phone]     Occupational Therapy Treatment Note/ Progress Report:     Is this a Progress Report:     [x]  Yes  []  No      If Yes:  Date Range for reporting period:  Beginning 3/16/22    Ending    Progress report will be due (10 Rx or 30 days whichever is less):     Recertification will be due (POC Duration  / 90 days whichever is less): 22   Patient: Mardene Fothergill                                    : 1950                                  MRN: [id number]  Referring Physician: Referring Practitioner: Sheryl Edwards                                           Evaluation Date: 3/16/2022                                         Medical Diagnosis Information:  Diagnosis: Z98.890, Z87.81 (ICD-10-CM) - Status post open reduction and internal fixation (ORIF) of fracture;   Treatment Dx:  R wrist pain M25.531                       Date of Injury: 22  Date of Surgery: 22                                  Insurance information:   [facility] and [facility]     Date of Patient follow up with Physician: 22  Has the plan of care been signed (Y/N):        []  Yes  [x]  No       Visit # Insurance Allowable Auth Required   8 BMN []  Yes []  No    From 3/16/22  to 2022      Preferred Language for Healthcare:   [x]English       []other:     Date of Patient follow up with Physician: 22     [x]? Patient reported history, allergies, and medications reviewed - see intake form. Preferred Language for Healthcare:   [x]? English       []? other:            RESTRICTIONS/PRECAUTIONS: post op precautions for Distal radius fx ORIF     Latex Allergy:  [x]? No      []? Yes                    Pacemaker:  []? No       []?  Yes         Functional Scale: 56 % (Quick DASH)                                Date assessed:  3/16/2022        C-SSRS Triggered by Intake questionnaire (Past 2 wk assessment):    No, Questionnaire did not trigger screening.         SUBJECTIVE:     Pt reports \"Dr Lliy Iraheta said I need to wear the brace a little longer when I am out and with sleeping because my fracture hasn't healed yet. \"   Patient reported deficits/history of current problem: Pt reports she tripped over log when walking her dog        OBJECTIVE:   Date:  Hand Dominance:     [x]? Right    []? Left 3/16/2022    3/28/22    Objective Measures:       PAIN 4/10 3/10 intermittent    Quick DASH 56%     Digits tip to DPFC in cm WFL     Thumb ROM WFL     Thumb opposition  WFL:     Thumb Radial/Palmar abd ROM R:  L:     Wrist ROM Ext/Flex R:38/32  L: 47/52    Rad/Uln dev ROM NT:     Forearm ROM  Sup/pron R:  L:50/65   62/78    Elbow ROM Ext/flex [facility]     Shoulder Flex  Shoulder Abd  Shoulder IR/ER    WFL     Edema in cm circumf. MCPJs R:  L:     Edema in cm circumf.   Wrist R:  L:      strength in lbs NT:     Pinch Strengthin lbs: lat  R:  L:     Pinch Strength in lbs:  3 point R:  L:        MMT:        Observations:  (including splints, bandages, incisions, scars): Sutures removed; steri strips present            MODALITIES: 3/28/22 3/30/22 4/4/22 4/6/22 4/11/22   Fluidotherapy (04947) 12' 10' 12' 12' 12'   Estim (73123/26528)        Paraffin (49059)        US (75064)  100% @ 1.5 8' at scar  8'    Iontophoresis (31305)        Hot Pack        Cold Pack                INTERVENTIONS:        Pt educ Dx info, scar mgt, review HEP                5' 5' 5' #2 6' 6'    Yellow putty  Rolling, shaping, mallet Yellow putty  Rolling, pulling Yellow putty  Rolling, pulling; mallet Yellow putty  Rolling, pulling; malle Yellow putty  Rolling, pulling; mallet             Red flex bar x20 wrist and forearm Red flex bar x20 wrist and forearm Red flex bar x20 wrist and forearm                       Verbal cues with exercises Verbal cues with exercises        Power  #1 with sponge  Power  #1 with sponge  Manual Therapy (34312) STM, scar massage. STM, scar massage; jt mob STM, scar massage; jt mob STM, scar massage; jt mob STM, scar massage; jt mob   (IASTM, Dry Needling, manual mobilization)                Splinting        Lcode:        Orthotic Mgmt, Subsequent Enc (46269)        Orthotic Mgmt & Training (63915)                Other:                  Therapeutic Exercise & NMR:  [] (28281) Provided verbal/tactile cueing for activities related to strengthening, flexibility, endurance, ROM  for improvements in scapular, scapulothoracic and UE control with self care, reaching, carrying, lifting, house/yardwork, driving/computer work.    [] (71489) Provided verbal/tactile cueing for activities related to improving balance, coordination, kinesthetic sense, posture, motor skill, proprioception  to assist with  scapular, scapulothoracic and UE control with self care, reaching, carrying, lifting, house/yardwork, driving/computer work.     Therapeutic Activities & NMR:    [] (16417 or 50127) Provided verbal/tactile cueing for activities related to improving balance, coordination, kinesthetic sense, posture, motor skill, proprioception and motor activation to allow for proper function of scapular, scapulothoracic and UE control with self care, carrying, lifting, driving/computer work    Home Exercise Program:    [] (04638) Reviewed/Progressed HEP activities related to strengthening, flexibility, endurance, ROM of scapular, scapulothoracic and UE control with self care, reaching, carrying, lifting, house/yardwork, driving/computer work  [] (10935) Reviewed/Progressed HEP activities related to improving balance, coordination, kinesthetic sense, posture, motor skill, proprioception of scapular, scapulothoracic and UE control with self care, reaching, carrying, lifting, house/yardwork, driving/computer work      Manual Treatments:  PROM / STM / Oscillations-Mobs:  G-I, II, III, IV (PA's, Inf., Post.)  [] ([id number]) Provided manual therapy to mobilize soft tissue/joints of cervical/CT, scapular GHJ and UE for the purpose of modulating pain, promoting relaxation,  increasing ROM, reducing/eliminating soft tissue swelling/inflammation/restriction, improving soft tissue extensibility and allowing for proper ROM for normal function with self care, reaching, carrying, lifting, house/yardwork, driving/computer work    ADL Training:  [] (95919) Provided self-care/home management training related to activities of daily living and compensatory training, and/or use of adaptive equipment      Charges:  Timed Code Treatment Minutes: 40'   Total Treatment Minutes: 46'   Worker's Comp: Time In/Time Out     [] EVAL (LOW) 34342 (typically 20 minutes face-to-face)    [] EVAL (MOD) 68656 (typically 30 minutes face-to-face)  [] EVAL (HIGH) 02277 (typically 45 minutes face-to-face)  [] OT Re-eval (95108)       [x] Robert [phone]) x 1     [] PAORA(18629)  [x] NMR (17205) x  1    [] Estim (attended) (11513)   [x] Manual ([id number]) x 1     [] US (06048)  [] TA (53091) x      [] Paraffin (69915)  [] ADL  (02 649 24 60) x     [] Splint/L code:    [] Estim (unattended) (22 489178)  [x] Fluidotherapy (17473)  [] Other:    Comorbidities Affecting Functional Performance:     []Anxiety (F41.9)/Depression (F32.9)   []Diabetes Type 1(E10.65) or 2 (E11.65)   []Rheumatoid Arthritis (M05.9)  []Fibromyalgia (M79.7)  []Neuropathy(G60.9)  []Osteoarthritis(M19.91)  []None   []Other:    ASSESSMENT:   Pt progressing with AROM and functional use    GOALS: to be able to use wrist and hand. Patient stated goal: To  Use her right arm    []? Progressing: []? Met: []? Not Met: []? Adjusted     Therapist goals for Patient:   Short Term Goals: To be achieved in: 2 weeks  1. Independent in HEP and progression per patient tolerance, in order to prevent re-injury. []? Progressing: [x]? Met: []? Not Met: []? Adjusted   2.  Patient will have a decrease in pain to facilitate improvement in movement, function, and ADLs as indicated by Functional Deficits. []? Progressing: [x]? Met: []? Not Met: []? Adjusted     Long Term Goals to be achieved in 12 weeks (through 6/11/22), including patient directed goals to address patient identified performance deficits:  1) Pt to be independent in graded HEP progression with a good level of effort and compliance. [x]? Progressing: []? Met: []? Not Met: []? Adjusted   2) Pt to report a score of </= 30 % on the Quick DASH disability questionnaire for increased performance with carrying, moving, and handling objects. [x]? Progressing: []? Met: []? Not Met: []? Adjusted   3) Pt will demonstrate increased ROM to [facility] for improved independence with self care, home mgt, and vocational responsibilities. [x]? Progressing: []? Met: []? Not Met: []? Adjusted   4) Pt will demonstrate increased  strength to at least 50% of other hand for improved independence with self care, home mgt, and vocational responsibilities. [x]? Progressing: []? Met: []? Not Met: []? Adjusted   5) Pt will have a decrease in pain to 2/10 to facilitate self care, home mgt, and vocational responsibilities. .  [x]? Progressing: []? Met: []? Not Met: []? Adjusted       Overall Progression Towards Functional Goals/Treatment Progress Update:  [] Patient is progressing as expected towards functional goals listed. [] Progression is slowed due to complexities/impairments listed. [] Progression has been slowed due to co-morbidities.   [x] Plan just implemented, too soon to assess goals progression <30 days  [] Goals require adjustment due to lack of progress  [] Patient is not progressing as expected and requires additional follow up with physician  [] All goals are met  [] Other:     Prognosis for POC: [x] Good [] Fair  [] Poor    Patient requires continued skilled intervention: [x] Yes  [] No    Treatment/Activity Tolerance:  [x] Patient able to complete treatment  [] Patient limited by fatigue  [] Patient limited by pain    [] Patient limited by other medical complications  [] Other:                  PLAN: See eval  [] Continue per plan of care [] Alter current plan (see comments above)  [x] Plan of care initiated [] Hold pending MD visit [] Discharge      Electronically signed by:  Liban Diego OTR/L [id number]    Note: If patient does not return for scheduled/ recommended follow up visits, this note will serve as a discharge from care along with most recent update on progress.   Phone: [phone]  Fax [phone]

## 2022-04-13 ENCOUNTER — HOSPITAL ENCOUNTER (OUTPATIENT)
Dept: OCCUPATIONAL THERAPY | Age: 72
Setting detail: THERAPIES SERIES
Discharge: HOME | End: 2022-04-13
Payer: MEDICARE

## 2022-04-13 PROCEDURE — 97140 MANUAL THERAPY 1/> REGIONS: CPT | Performed by: OCCUPATIONAL THERAPIST

## 2022-04-13 PROCEDURE — 97022 WHIRLPOOL THERAPY: CPT | Performed by: OCCUPATIONAL THERAPIST

## 2022-04-13 PROCEDURE — 97110 THERAPEUTIC EXERCISES: CPT | Performed by: OCCUPATIONAL THERAPIST

## 2022-04-13 NOTE — FLOWSHEET NOTE
[address] [place], [address] [place] [address] [address]  Phone: [phone] Fax: [phone]     Occupational Therapy Treatment Note/ Progress Report:     Is this a Progress Report:     [x]  Yes  []  No      If Yes:  Date Range for reporting period:  Beginning 3/16/22    Ending    Progress report will be due (10 Rx or 30 days whichever is less):     Recertification will be due (POC Duration  / 90 days whichever is less): 22   Patient: Sveta Johnson                                    : 1950                                  MRN: [id number]  Referring Physician: Referring Practitioner: Melanie Hair                                           Evaluation Date: 3/16/2022                                         Medical Diagnosis Information:  Diagnosis: Z98.890, Z87.81 (ICD-10-CM) - Status post open reduction and internal fixation (ORIF) of fracture;   Treatment Dx:  R wrist pain M25.531                       Date of Injury: 22  Date of Surgery: 22                                  Insurance information:   W Shawn [employer]me and [employer]     Date of Patient follow up with Physician: 22  Has the plan of care been signed (Y/N):        []  Yes  [x]  No       Visit # Insurance Allowable Auth Required   9 BMN []  Yes []  No    From 3/16/22  to 2022      Preferred Language for Healthcare:   [x]English       []other:     Date of Patient follow up with Physician: 22     [x]? Patient reported history, allergies, and medications reviewed - see intake form. Preferred Language for Healthcare:   [x]? English       []? other:            RESTRICTIONS/PRECAUTIONS: post op precautions for Distal radius fx ORIF     Latex Allergy:  [x]? No      []? Yes                    Pacemaker:  []? No       []?  Yes         Functional Scale: 56 % (Quick DASH)                                Date assessed:  3/16/2022        C-SSRS Triggered by Intake questionnaire (Past 2 wk assessment):    No, Questionnaire did not trigger screening.         SUBJECTIVE:     Pt reports that she was able to mow her lawn with self propelled mower yesterday. Patient reported deficits/history of current problem: Pt reports she tripped over log when walking her dog        OBJECTIVE:   Date:  Hand Dominance:     [x]? Right    []? Left 3/16/2022    3/28/22    Objective Measures:       PAIN 4/10 3/10 intermittent    Quick DASH 56%     Digits tip to DPFC in cm WFL     Thumb ROM WFL     Thumb opposition  WFL:     Thumb Radial/Palmar abd ROM R:  L:     Wrist ROM Ext/Flex R:38/32  L: 47/52    Rad/Uln dev ROM NT:     Forearm ROM  Sup/pron R:  L:50/65   62/78    Elbow ROM Ext/flex [facility]     Shoulder Flex  Shoulder Abd  Shoulder IR/ER    WFL     Edema in cm circumf. MCPJs R:  L:     Edema in cm circumf.   Wrist R:  L:      strength in lbs NT:     Pinch Strengthin lbs: lat  R:  L:     Pinch Strength in lbs:  3 point R:  L:        MMT:        Observations:  (including splints, bandages, incisions, scars): Sutures removed; steri strips present            MODALITIES: 3/28/22 3/30/22 4/4/22 4/6/22 4/11/22 4/13/22   Fluidotherapy (05930) 12' 10' 12' 12' 12' 12   Estim (38745/99555)         Paraffin (88624)         US (14192)  100% @ 1.5 8' at scar  8'     Iontophoresis (57123)         Hot Pack         Cold Pack                  INTERVENTIONS:         Pt educ Dx info, scar mgt, review HEP                  5' 5' 5' #2 6' 6' #2 6'    Yellow putty  Rolling, shaping, mallet Yellow putty  Rolling, pulling Yellow putty  Rolling, pulling; mallet Yellow putty  Rolling, pulling; malle Yellow putty  Rolling, pulling; mallet Yellow putty  Rolling, pulling; mallet              Red flex bar x20 wrist and forearm Red flex bar x20 wrist and forearm Red flex bar x20 wrist and forearm  Red flex bar x20 wrist and forearm                        Verbal cues with exercises Verbal cues with exercises         Power  #1 with sponge  Power  #1 with sponge                                Manual Therapy (54331) STM, scar massage. STM, scar massage; jt mob STM, scar massage; jt mob STM, scar massage; jt mob STM, scar massage; jt mob STM, scar massage; jt mob   (IASTM, Dry Needling, manual mobilization)                  Splinting         Lcode:         Orthotic Mgmt, Subsequent Enc (01246)         Orthotic Mgmt & Training (27501)                  Other:                    Therapeutic Exercise & NMR:  [] (31266) Provided verbal/tactile cueing for activities related to strengthening, flexibility, endurance, ROM  for improvements in scapular, scapulothoracic and UE control with self care, reaching, carrying, lifting, house/yardwork, driving/computer work.    [] (31224) Provided verbal/tactile cueing for activities related to improving balance, coordination, kinesthetic sense, posture, motor skill, proprioception  to assist with  scapular, scapulothoracic and UE control with self care, reaching, carrying, lifting, house/yardwork, driving/computer work.     Therapeutic Activities & NMR:    [] (79264 or 44773) Provided verbal/tactile cueing for activities related to improving balance, coordination, kinesthetic sense, posture, motor skill, proprioception and motor activation to allow for proper function of scapular, scapulothoracic and UE control with self care, carrying, lifting, driving/computer work    Home Exercise Program:    [] (13267) Reviewed/Progressed HEP activities related to strengthening, flexibility, endurance, ROM of scapular, scapulothoracic and UE control with self care, reaching, carrying, lifting, house/yardwork, driving/computer work  [] (93312) Reviewed/Progressed HEP activities related to improving balance, coordination, kinesthetic sense, posture, motor skill, proprioception of scapular, scapulothoracic and UE control with self care, reaching, carrying, lifting, house/yardwork, driving/computer work      Manual Treatments:  PROM / STM / Oscillations-Mobs:  G-I, II, III, IV (PA's, Inf., Post.)  [] (87876) Provided manual therapy to mobilize soft tissue/joints of cervical/CT, scapular GHJ and UE for the purpose of modulating pain, promoting relaxation,  increasing ROM, reducing/eliminating soft tissue swelling/inflammation/restriction, improving soft tissue extensibility and allowing for proper ROM for normal function with self care, reaching, carrying, lifting, house/yardwork, driving/computer work    ADL Training:  [] (06339) Provided self-care/home management training related to activities of daily living and compensatory training, and/or use of adaptive equipment      Charges:  Timed Code Treatment Minutes: 45'   Total Treatment Minutes: 45   Worker's Comp: Time In/Time Out     [] EVAL (LOW) 71258 (typically 20 minutes face-to-face)    [] EVAL (MOD) 28150 (typically 30 minutes face-to-face)  [] EVAL (HIGH) 53502 (typically 45 minutes face-to-face)  [] OT Re-eval (64993)       [x] Robert [phone]) x 1     [] KKJVR(31474)  [] NMR (44669) x  1    [] Estim (attended) (27573)   [x] Manual ([phone]) x 1     [] US (80127)  [] TA (14302) x      [] Paraffin (33131)  [] ADL  (30774) x     [] Splint/L code:    [] Estim (unattended) (22 389121)  [x] Fluidotherapy (32336)  [] Other:    Comorbidities Affecting Functional Performance:     []Anxiety (F41.9)/Depression (F32.9)   []Diabetes Type 1(E10.65) or 2 (E11.65)   []Rheumatoid Arthritis (M05.9)  []Fibromyalgia (M79.7)  []Neuropathy(G60.9)  []Osteoarthritis(M19.91)  []None   []Other:    ASSESSMENT:   Pt progressing with AROM and functional use    GOALS: to be able to use wrist and hand. Patient stated goal: To  Use her right arm    []? Progressing: []? Met: []? Not Met: []? Adjusted     Therapist goals for Patient:   Short Term Goals: To be achieved in: 2 weeks  1. Independent in HEP and progression per patient tolerance, in order to prevent re-injury. []? Progressing: [x]? Met: []?  Not Met: []? Adjusted   2. Patient will have a decrease in pain to facilitate improvement in movement, function, and ADLs as indicated by Functional Deficits. []? Progressing: [x]? Met: []? Not Met: []? Adjusted     Long Term Goals to be achieved in 12 weeks (through 6/11/22), including patient directed goals to address patient identified performance deficits:  1) Pt to be independent in graded HEP progression with a good level of effort and compliance. [x]? Progressing: []? Met: []? Not Met: []? Adjusted   2) Pt to report a score of </= 30 % on the Quick DASH disability questionnaire for increased performance with carrying, moving, and handling objects. [x]? Progressing: []? Met: []? Not Met: []? Adjusted   3) Pt will demonstrate increased ROM to [facility] for improved independence with self care, home mgt, and vocational responsibilities. [x]? Progressing: []? Met: []? Not Met: []? Adjusted   4) Pt will demonstrate increased  strength to at least 50% of other hand for improved independence with self care, home mgt, and vocational responsibilities. [x]? Progressing: []? Met: []? Not Met: []? Adjusted   5) Pt will have a decrease in pain to 2/10 to facilitate self care, home mgt, and vocational responsibilities. .  [x]? Progressing: []? Met: []? Not Met: []? Adjusted       Overall Progression Towards Functional Goals/Treatment Progress Update:  [] Patient is progressing as expected towards functional goals listed. [] Progression is slowed due to complexities/impairments listed. [] Progression has been slowed due to co-morbidities.   [x] Plan just implemented, too soon to assess goals progression <30 days  [] Goals require adjustment due to lack of progress  [] Patient is not progressing as expected and requires additional follow up with physician  [] All goals are met  [] Other:     Prognosis for POC: [x] Good [] Fair  [] Poor    Patient requires continued skilled intervention: [x] Yes  [] No    Treatment/Activity Tolerance:  [x] Patient able to complete treatment  [] Patient limited by fatigue  [] Patient limited by pain    [] Patient limited by other medical complications  [] Other:                  PLAN: See eval  [] Continue per plan of care [] Alter current plan (see comments above)  [x] Plan of care initiated [] Hold pending MD visit [] Discharge      Electronically signed by:  Tiffany POLANCO/L [id number]    Note: If patient does not return for scheduled/ recommended follow up visits, this note will serve as a discharge from care along with most recent update on progress.   Phone: [phone]  Fax [phone]

## 2022-04-18 ENCOUNTER — HOSPITAL ENCOUNTER (OUTPATIENT)
Dept: OCCUPATIONAL THERAPY | Age: 72
Setting detail: THERAPIES SERIES
Discharge: HOME | End: 2022-04-18
Payer: MEDICARE

## 2022-04-18 PROCEDURE — 97035 APP MDLTY 1+ULTRASOUND EA 15: CPT | Performed by: OCCUPATIONAL THERAPIST

## 2022-04-18 PROCEDURE — 97022 WHIRLPOOL THERAPY: CPT | Performed by: OCCUPATIONAL THERAPIST

## 2022-04-18 PROCEDURE — 97140 MANUAL THERAPY 1/> REGIONS: CPT | Performed by: OCCUPATIONAL THERAPIST

## 2022-04-18 PROCEDURE — 97110 THERAPEUTIC EXERCISES: CPT | Performed by: OCCUPATIONAL THERAPIST

## 2022-04-18 NOTE — FLOWSHEET NOTE
2 [address] [place], [address] [place] [address] [address]  Phone: [phone] Fax: [phone]     Occupational Therapy Treatment Note/ Progress Report:     Is this a Progress Report:     [x]  Yes  []  No      If Yes:  Date Range for reporting period:  Beginning 3/16/22    Ending    Progress report will be due (10 Rx or 30 days whichever is less):     Recertification will be due (POC Duration  / 90 days whichever is less): 22   Patient: Frances De Jesus                                    : 1950                                  MRN: [id number]  Referring Physician: Referring Practitioner: Eladio Chen                                           Evaluation Date: 3/16/2022                                         Medical Diagnosis Information:  Diagnosis: Z98.890, Z87.81 (ICD-10-CM) - Status post open reduction and internal fixation (ORIF) of fracture;   Treatment Dx:  R wrist pain M25.531                       Date of Injury: 22  Date of Surgery: 22                                  Insurance information:  [facility]     Date of Patient follow up with Physician: 22  Has the plan of care been signed (Y/N):        []  Yes  [x]  No       Visit # Insurance Allowable Auth Required   10 BMN []  Yes []  No    From 3/16/22  to 2022      Preferred Language for Healthcare:   [x]English       []other:     Date of Patient follow up with Physician: 22     [x]? Patient reported history, allergies, and medications reviewed - see intake form. Preferred Language for Healthcare:   [x]? English       []? other:            RESTRICTIONS/PRECAUTIONS: post op precautions for Distal radius fx ORIF     Latex Allergy:  [x]? No      []? Yes                    Pacemaker:  []? No       []?  Yes         Functional Scale: 56 % (Quick DASH)                                Date assessed:  3/16/2022        C-SSRS Triggered by Intake questionnaire (Past 2 wk assessment):    No, Questionnaire did not trigger screening.         SUBJECTIVE:     Pt reports that she was able to mow her lawn with self propelled mower yesterday. Patient reported deficits/history of current problem: Pt reports she tripped over log when walking her dog        OBJECTIVE:   Date:  Hand Dominance:     [x]? Right    []? Left 3/16/2022    3/28/22 4/18/22   Objective Measures:       PAIN 4/10 3/10 intermittent 2/10   Quick DASH 56%     Digits tip to DPFC in cm WFL     Thumb ROM WFL     Thumb opposition  WFL:     Thumb Radial/Palmar abd ROM R:  L:     Wrist ROM Ext/Flex R:38/32  L: 47/52 45/48   Rad/Uln dev ROM NT:     Forearm ROM  Sup/pron R:  L:50/65   62/78    Elbow ROM Ext/flex [facility]     Shoulder Flex  Shoulder Abd  Shoulder IR/ER    WFL     Edema in cm circumf. MCPJs R:  L:     Edema in cm circumf.   Wrist R:  L:      strength in lbs NT:     Pinch Strengthin lbs: lat  R:  L:     Pinch Strength in lbs:  3 point R:  L:        MMT:        Observations:  (including splints, bandages, incisions, scars): Sutures removed; steri strips present            MODALITIES: 3/28/22 3/30/22 4/4/22 4/6/22 4/11/22 4/13/22 4/18/22   Fluidotherapy (51675) 12' 10' 12' 12' 12' 12 10'   Estim (93641/87429)          Paraffin (42302)          US (53458)  100% @ 1.5 8' at scar  8'   8'   Iontophoresis (47568)          Hot Pack          Cold Pack                    INTERVENTIONS:          Pt educ Dx info, scar mgt, review HEP                    5' 5' 5' #2 6' 6' #2 6' 6'    Yellow putty  Rolling, shaping, mallet Yellow putty  Rolling, pulling Yellow putty  Rolling, pulling; mallet Yellow putty  Rolling, pulling; malle Yellow putty  Rolling, pulling; mallet Yellow putty  Rolling, pulling; mallet Yellow putty  Rolling, pulling; mallet               Red flex bar x20 wrist and forearm Red flex bar x20 wrist and forearm Red flex bar x20 wrist and forearm  Red flex bar x20 wrist and forearm Red flex bar x20 wrist and forearm                          Verbal cues with exercises Verbal cues with exercises          Power  #1 with sponge  Power  #1 with sponge                                    Manual Therapy (08873) STM, scar massage. STM, scar massage; jt mob STM, scar massage; jt mob STM, scar massage; jt mob STM, scar massage; jt mob STM, scar massage; jt mob STM, scar massage; jt mob   (IASTM, Dry Needling, manual mobilization)                    Splinting          Lcode:          Orthotic Mgmt, Subsequent Enc (09471)          Orthotic Mgmt & Training (82382)                    Other:                      Therapeutic Exercise & NMR:  [] (01838) Provided verbal/tactile cueing for activities related to strengthening, flexibility, endurance, ROM  for improvements in scapular, scapulothoracic and UE control with self care, reaching, carrying, lifting, house/yardwork, driving/computer work.    [] (12213) Provided verbal/tactile cueing for activities related to improving balance, coordination, kinesthetic sense, posture, motor skill, proprioception  to assist with  scapular, scapulothoracic and UE control with self care, reaching, carrying, lifting, house/yardwork, driving/computer work.     Therapeutic Activities & NMR:    [] (26252 or 20374) Provided verbal/tactile cueing for activities related to improving balance, coordination, kinesthetic sense, posture, motor skill, proprioception and motor activation to allow for proper function of scapular, scapulothoracic and UE control with self care, carrying, lifting, driving/computer work    Home Exercise Program:    [] (14098) Reviewed/Progressed HEP activities related to strengthening, flexibility, endurance, ROM of scapular, scapulothoracic and UE control with self care, reaching, carrying, lifting, house/yardwork, driving/computer work  [] (22919) Reviewed/Progressed HEP activities related to improving balance, coordination, kinesthetic sense, posture, motor skill, proprioception of scapular, scapulothoracic and UE control with self care, reaching, carrying, lifting, house/yardwork, driving/computer work      Manual Treatments:  PROM / STM / Oscillations-Mobs:  G-I, II, III, IV (PA's, Inf., Post.)  [] (69550) Provided manual therapy to mobilize soft tissue/joints of cervical/CT, scapular GHJ and UE for the purpose of modulating pain, promoting relaxation,  increasing ROM, reducing/eliminating soft tissue swelling/inflammation/restriction, improving soft tissue extensibility and allowing for proper ROM for normal function with self care, reaching, carrying, lifting, house/yardwork, driving/computer work    ADL Training:  [] (07461) Provided self-care/home management training related to activities of daily living and compensatory training, and/or use of adaptive equipment      Charges:  Timed Code Treatment Minutes: 45'   Total Treatment Minutes: 55   Worker's Comp: Time In/Time Out     [] EVAL (LOW) 95323 (typically 20 minutes face-to-face)    [] EVAL (MOD) 45944 (typically 30 minutes face-to-face)  [] EVAL (HIGH) 82268 (typically 45 minutes face-to-face)  [] OT Re-eval (79406)       [x] Robert ([id number]) x 1     [] HTPYQ(97597)  [] NMR (25366) x  1    [] Estim (attended) (75308)   [x] Manual ([address] [facility]) x 1     [x] US (43364)  [] TA (90742) x      [] Paraffin (07608)  [] ADL  ([phone]) x     [] Splint/L code:    [] Estim (unattended) (22 384070)  [x] Fluidotherapy (53148)  [] Other:    Comorbidities Affecting Functional Performance:     []Anxiety (F41.9)/Depression (F32.9)   []Diabetes Type 1(E10.65) or 2 (E11.65)   []Rheumatoid Arthritis (M05.9)  []Fibromyalgia (M79.7)  []Neuropathy(G60.9)  []Osteoarthritis(M19.91)  []None   []Other:    ASSESSMENT:   Pt progressing with AROM and functional use    GOALS: to be able to use wrist and hand. Patient stated goal: To  Use her right arm    []? Progressing: []? Met: []? Not Met: []?  Adjusted     Therapist goals for Patient:   Short Term Goals: To be achieved in: 2 weeks  1. Independent in HEP and progression per patient tolerance, in order to prevent re-injury. []? Progressing: [x]? Met: []? Not Met: []? Adjusted   2. Patient will have a decrease in pain to facilitate improvement in movement, function, and ADLs as indicated by Functional Deficits. []? Progressing: [x]? Met: []? Not Met: []? Adjusted     Long Term Goals to be achieved in 12 weeks (through 6/11/22), including patient directed goals to address patient identified performance deficits:  1) Pt to be independent in graded HEP progression with a good level of effort and compliance. [x]? Progressing: []? Met: []? Not Met: []? Adjusted   2) Pt to report a score of </= 30 % on the Quick DASH disability questionnaire for increased performance with carrying, moving, and handling objects. [x]? Progressing: []? Met: []? Not Met: []? Adjusted   3) Pt will demonstrate increased ROM to [facility] for improved independence with self care, home mgt, and vocational responsibilities. [x]? Progressing: []? Met: []? Not Met: []? Adjusted   4) Pt will demonstrate increased  strength to at least 50% of other hand for improved independence with self care, home mgt, and vocational responsibilities. [x]? Progressing: []? Met: []? Not Met: []? Adjusted   5) Pt will have a decrease in pain to 2/10 to facilitate self care, home mgt, and vocational responsibilities. .  [x]? Progressing: []? Met: []? Not Met: []? Adjusted       Overall Progression Towards Functional Goals/Treatment Progress Update:  [] Patient is progressing as expected towards functional goals listed. [] Progression is slowed due to complexities/impairments listed. [] Progression has been slowed due to co-morbidities.   [x] Plan just implemented, too soon to assess goals progression <30 days  [] Goals require adjustment due to lack of progress  [] Patient is not progressing as expected and requires additional follow up with physician  [] All goals are met  [] Other:     Prognosis for POC: [x] Good [] Fair  [] Poor    Patient requires continued skilled intervention: [x] Yes  [] No    Treatment/Activity Tolerance:  [x] Patient able to complete treatment  [] Patient limited by fatigue  [] Patient limited by pain    [] Patient limited by other medical complications  [] Other:                  PLAN: See eval  [] Continue per plan of care [] Alter current plan (see comments above)  [x] Plan of care initiated [] Hold pending MD visit [] Discharge      Electronically signed by:  Pino POLANCO/L [id number]    Note: If patient does not return for scheduled/ recommended follow up visits, this note will serve as a discharge from care along with most recent update on progress.   Phone: [phone]  Fax [phone]

## 2022-04-20 ENCOUNTER — HOSPITAL ENCOUNTER (OUTPATIENT)
Dept: OCCUPATIONAL THERAPY | Age: 72
Setting detail: THERAPIES SERIES
Discharge: HOME | End: 2022-04-20
Payer: MEDICARE

## 2022-04-20 PROCEDURE — 97140 MANUAL THERAPY 1/> REGIONS: CPT | Performed by: OCCUPATIONAL THERAPIST

## 2022-04-20 PROCEDURE — 97035 APP MDLTY 1+ULTRASOUND EA 15: CPT | Performed by: OCCUPATIONAL THERAPIST

## 2022-04-20 PROCEDURE — 97110 THERAPEUTIC EXERCISES: CPT | Performed by: OCCUPATIONAL THERAPIST

## 2022-04-20 NOTE — FLOWSHEET NOTE
[address] [place] [address],[address] [place], [address] [place] [address] [address]  Phone: [phone] Fax: [phone]     Occupational Therapy Treatment Note/ Progress Report:     Is this a Progress Report:     [x]  Yes  []  No      If Yes:  Date Range for reporting period:  Beginning 3/16/22    Ending    Progress report will be due (10 Rx or 30 days whichever is less):     Recertification will be due (POC Duration  / 90 days whichever is less): 22   Patient: Lisseth Durham                                    : 1950                                  MRN: [id number]  Referring Physician: Referring Practitioner: Rodrigo Shook                                           Evaluation Date: 3/16/2022                                         Medical Diagnosis Information:  Diagnosis: Z98.890, Z87.81 (ICD-10-CM) - Status post open reduction and internal fixation (ORIF) of fracture;   Treatment Dx:  R wrist pain M25.531                       Date of Injury: 22  Date of Surgery: 22                                  Insurance information:  [facility] and [facility]     Date of Patient follow up with Physician: 22  Has the plan of care been signed (Y/N):        []  Yes  [x]  No       Visit # Insurance Allowable Auth Required   11 BMN []  Yes []  No    From 3/16/22  to 2022      Preferred Language for Healthcare:   [x]English       []other:     Date of Patient follow up with Physician: 22     [x]? Patient reported history, allergies, and medications reviewed - see intake form. Preferred Language for Healthcare:   [x]? English       []? other:            RESTRICTIONS/PRECAUTIONS: post op precautions for Distal radius fx ORIF     Latex Allergy:  [x]? No      []? Yes                    Pacemaker:  []? No       []?  Yes         Functional Scale: 56 % (Quick DASH)                                Date assessed:  3/16/2022        C-SSRS Triggered by Intake questionnaire (Past 2 wk assessment):    No, Questionnaire did not trigger screening.         SUBJECTIVE:     Pt reports that she was able to mow her lawn with self propelled mower yesterday. Patient reported deficits/history of current problem: Pt reports she tripped over log when walking her dog        OBJECTIVE:   Date:  Hand Dominance:     [x]? Right    []? Left 3/16/2022    3/28/22 4/18/22   Objective Measures:       PAIN 4/10 3/10 intermittent 2/10   Quick DASH 56%     Digits tip to DPFC in cm WFL     Thumb ROM WFL     Thumb opposition  WFL:     Thumb Radial/Palmar abd ROM R:  L:     Wrist ROM Ext/Flex R:38/32  L: 47/52 45/48   Rad/Uln dev ROM NT:     Forearm ROM  Sup/pron R:  L:50/65   62/78    Elbow ROM Ext/flex [facility]     Shoulder Flex  Shoulder Abd  Shoulder IR/ER    WFL     Edema in cm circumf. MCPJs R:  L:     Edema in cm circumf.   Wrist R:  L:      strength in lbs NT:     Pinch Strengthin lbs: lat  R:  L:     Pinch Strength in lbs:  3 point R:  L:        MMT:        Observations:  (including splints, bandages, incisions, scars): Sutures removed; steri strips present            MODALITIES: 4/4/22 4/6/22 4/11/22 4/13/22 4/18/22 4/20/22   Fluidotherapy (11568) 12' 12' 12' 12 10'    Estim (97952/94109)         Paraffin (41173)         US (06234)  8'   8' 8'   Iontophoresis (15866)         Hot Pack         Cold Pack                  INTERVENTIONS:         Pt educ                  UE bike 5' #2 6' 6' #2 6' 6' #2 6'    Yellow putty  Rolling, pulling; mallet Yellow putty  Rolling, pulling; malle Yellow putty  Rolling, pulling; mallet Yellow putty  Rolling, pulling; mallet Yellow putty  Rolling, pulling; mallet Yellow putty  Rolling, pulling; mallet             Red flex bar x20 wrist and forearm Red flex bar x20 wrist and forearm  Red flex bar x20 wrist and forearm Red flex bar x20 wrist and forearm Red flex bar x20 wrist and forearm         digiflex x 25             Verbal cues with exercises Verbal cues with exercises Power  #1 with sponge  Power  #1 with sponge    Power  #1 with sponge                               Manual Therapy (22505) STM, scar massage; jt mob STM, scar massage; jt mob STM, scar massage; jt mob STM, scar massage; jt mob STM, scar massage; jt mob STM, scar massage; jt mob   (IASTM, Dry Needling, manual mobilization)                  Splinting         Lcode:         Orthotic Mgmt, Subsequent Enc (64268)         Orthotic Mgmt & Training (18334)                  Other:                    Therapeutic Exercise & NMR:  [] (91311) Provided verbal/tactile cueing for activities related to strengthening, flexibility, endurance, ROM  for improvements in scapular, scapulothoracic and UE control with self care, reaching, carrying, lifting, house/yardwork, driving/computer work.    [] (63226) Provided verbal/tactile cueing for activities related to improving balance, coordination, kinesthetic sense, posture, motor skill, proprioception  to assist with  scapular, scapulothoracic and UE control with self care, reaching, carrying, lifting, house/yardwork, driving/computer work.     Therapeutic Activities & NMR:    [] (55381 or 82729) Provided verbal/tactile cueing for activities related to improving balance, coordination, kinesthetic sense, posture, motor skill, proprioception and motor activation to allow for proper function of scapular, scapulothoracic and UE control with self care, carrying, lifting, driving/computer work    Home Exercise Program:    [] (71689) Reviewed/Progressed HEP activities related to strengthening, flexibility, endurance, ROM of scapular, scapulothoracic and UE control with self care, reaching, carrying, lifting, house/yardwork, driving/computer work  [] (90572) Reviewed/Progressed HEP activities related to improving balance, coordination, kinesthetic sense, posture, motor skill, proprioception of scapular, scapulothoracic and UE control with self care, reaching, carrying, lifting, house/yardwork, driving/computer work      Manual Treatments:  PROM / STM / Oscillations-Mobs:  G-I, II, III, IV (PA's, Inf., Post.)  [] (68848) Provided manual therapy to mobilize soft tissue/joints of cervical/CT, scapular GHJ and UE for the purpose of modulating pain, promoting relaxation,  increasing ROM, reducing/eliminating soft tissue swelling/inflammation/restriction, improving soft tissue extensibility and allowing for proper ROM for normal function with self care, reaching, carrying, lifting, house/yardwork, driving/computer work    ADL Training:  [] (45324) Provided self-care/home management training related to activities of daily living and compensatory training, and/or use of adaptive equipment      Charges:  Timed Code Treatment Minutes: 48'   Total Treatment Minutes: 48'   Worker's Comp: Time In/Time Out     [] EVAL (LOW) 39245 (typically 20 minutes face-to-face)    [] EVAL (MOD) 89439 (typically 30 minutes face-to-face)  [] EVAL (HIGH) 04229 (typically 45 minutes face-to-face)  [] OT Re-eval (15303)       [x] Robert [phone]) x 2     [] GHYSV(84972)  [] NMR (45363) x      [] Estim (attended) (72170)   [x] Manual ([id number]) x 1     [x] US (72771)  [] TA (97809) x      [] Paraffin (35867)  [] ADL  ([phone]) x     [] Splint/L code:    [] Estim (unattended) (22 066285)  [] Fluidotherapy (56514)  [] Other:    Comorbidities Affecting Functional Performance:     []Anxiety (F41.9)/Depression (F32.9)   []Diabetes Type 1(E10.65) or 2 (E11.65)   []Rheumatoid Arthritis (M05.9)  []Fibromyalgia (M79.7)  []Neuropathy(G60.9)  []Osteoarthritis(M19.91)  []None   []Other:    ASSESSMENT:   Pt progressing with AROM and functional use    GOALS: to be able to use wrist and hand. Patient stated goal: To  Use her right arm    []? Progressing: []? Met: []? Not Met: []? Adjusted     Therapist goals for Patient:   Short Term Goals: To be achieved in: 2 weeks  1.  Independent in HEP and progression per patient tolerance, in order to prevent re-injury. []? Progressing: [x]? Met: []? Not Met: []? Adjusted   2. Patient will have a decrease in pain to facilitate improvement in movement, function, and ADLs as indicated by Functional Deficits. []? Progressing: [x]? Met: []? Not Met: []? Adjusted     Long Term Goals to be achieved in 12 weeks (through 6/11/22), including patient directed goals to address patient identified performance deficits:  1) Pt to be independent in graded HEP progression with a good level of effort and compliance. [x]? Progressing: []? Met: []? Not Met: []? Adjusted   2) Pt to report a score of </= 30 % on the Quick DASH disability questionnaire for increased performance with carrying, moving, and handling objects. [x]? Progressing: []? Met: []? Not Met: []? Adjusted   3) Pt will demonstrate increased ROM to [facility] for improved independence with self care, home mgt, and vocational responsibilities. [x]? Progressing: []? Met: []? Not Met: []? Adjusted   4) Pt will demonstrate increased  strength to at least 50% of other hand for improved independence with self care, home mgt, and vocational responsibilities. [x]? Progressing: []? Met: []? Not Met: []? Adjusted   5) Pt will have a decrease in pain to 2/10 to facilitate self care, home mgt, and vocational responsibilities. .  [x]? Progressing: []? Met: []? Not Met: []? Adjusted       Overall Progression Towards Functional Goals/Treatment Progress Update:  [] Patient is progressing as expected towards functional goals listed. [] Progression is slowed due to complexities/impairments listed. [] Progression has been slowed due to co-morbidities.   [x] Plan just implemented, too soon to assess goals progression <30 days  [] Goals require adjustment due to lack of progress  [] Patient is not progressing as expected and requires additional follow up with physician  [] All goals are met  [] Other:     Prognosis for POC: [x] Good [] Fair  [] Poor    Patient requires

## 2022-04-25 ENCOUNTER — HOSPITAL ENCOUNTER (OUTPATIENT)
Dept: OCCUPATIONAL THERAPY | Age: 72
Setting detail: THERAPIES SERIES
Discharge: HOME | End: 2022-04-25
Payer: MEDICARE

## 2022-04-25 PROCEDURE — 97035 APP MDLTY 1+ULTRASOUND EA 15: CPT | Performed by: OCCUPATIONAL THERAPIST

## 2022-04-25 PROCEDURE — 97140 MANUAL THERAPY 1/> REGIONS: CPT | Performed by: OCCUPATIONAL THERAPIST

## 2022-04-25 PROCEDURE — 97022 WHIRLPOOL THERAPY: CPT | Performed by: OCCUPATIONAL THERAPIST

## 2022-04-25 PROCEDURE — 97110 THERAPEUTIC EXERCISES: CPT | Performed by: OCCUPATIONAL THERAPIST

## 2022-04-25 NOTE — FLOWSHEET NOTE
[address] [place] [address],[address]  Phone: [phone] Fax: [phone]     Occupational Therapy Treatment Note/ Progress Report:     Is this a Progress Report:     [x]  Yes  []  No      If Yes:  Date Range for reporting period:  Beginning 3/16/22    Ending    Progress report will be due (10 Rx or 30 days whichever is less):     Recertification will be due (POC Duration  / 90 days whichever is less): 22   Patient: Kirk Zimmerman                                    : 1950                                  MRN: [id number]  Referring Physician: Referring Practitioner: Liana Jiménez                                           Evaluation Date: 3/16/2022                                         Medical Diagnosis Information:  Diagnosis: Z98.890, Z87.81 (ICD-10-CM) - Status post open reduction and internal fixation (ORIF) of fracture;   Treatment Dx:  R wrist pain M25.531                       Date of Injury: 22  Date of Surgery: 22                                  Insurance information:  [facility] and [employer]     Date of Patient follow up with Physician: 22  Has the plan of care been signed (Y/N):        []  Yes  [x]  No       Visit # Insurance Allowable Auth Required   12 BMN []  Yes []  No    From 3/16/22  to 2022      Preferred Language for Healthcare:   [x]English       []other:     Date of Patient follow up with Physician: 22     [x]? Patient reported history, allergies, and medications reviewed - see intake form. Preferred Language for Healthcare:   [x]? English       []? other:            RESTRICTIONS/PRECAUTIONS: post op precautions for Distal radius fx ORIF     Latex Allergy:  [x]? No      []? Yes                    Pacemaker:  []? No       []?  Yes         Functional Scale: 56 % (Quick DASH)                                Date assessed:  3/16/2022        C-SSRS Triggered by Intake questionnaire (Past 2 wk assessment):    No, Questionnaire did not trigger screening.         SUBJECTIVE:     Pt reports tightness at MPs and dorsal side of hand    Patient reported deficits/history of current problem: Pt reports she tripped over log when walking her dog        OBJECTIVE:   Date:  Hand Dominance:     [x]? Right    []? Left 3/16/2022    3/28/22 4/18/22   Objective Measures:       PAIN 4/10 3/10 intermittent 2/10   Quick DASH 56%     Digits tip to DPFC in cm WFL     Thumb ROM WFL     Thumb opposition  WFL:     Thumb Radial/Palmar abd ROM R:  L:     Wrist ROM Ext/Flex R:38/32  L: 47/52 45/48   Rad/Uln dev ROM NT:     Forearm ROM  Sup/pron R:  L:50/65   62/78    Elbow ROM Ext/flex [facility]     Shoulder Flex  Shoulder Abd  Shoulder IR/ER    WFL     Edema in cm circumf. MCPJs R:  L:     Edema in cm circumf.   Wrist R:  L:      strength in lbs NT:     Pinch Strengthin lbs: lat  R:  L:     Pinch Strength in lbs:  3 point R:  L:        MMT:        Observations:  (including splints, bandages, incisions, scars): Sutures removed; steri strips present            MODALITIES: 4/11/22 4/13/22 4/18/22 4/20/22 4/25/22   Fluidotherapy (00716) 12' 12 10'  10'   Estim (72414/13886)        Paraffin (32423)        US (36192)   8' 8' 8'   Iontophoresis (13089)        Hot Pack        Cold Pack                INTERVENTIONS:        Pt educ                UE bike 6' #2 6' 6' #2 6' #2 6'    Yellow putty  Rolling, pulling; mallet Yellow putty  Rolling, pulling; mallet Yellow putty  Rolling, pulling; mallet Yellow putty  Rolling, pulling; mallet Yellow putty  Rolling, pulling; mallet             Red flex bar x20 wrist and forearm Red flex bar x20 wrist and forearm Red flex bar x20 wrist and forearm Red flex bar x20 wrist and forearm       digiflex x 25                     Power  #1 with sponge    Power  #1 with sponge  Power  #2 x25                           Manual Therapy (84629) STM, scar massage; jt mob STM, scar massage; jt mob STM, scar massage; jt mob STM, scar massage; jt mob STM, scar massage; jt mob   (IASTM, Dry Needling, manual mobilization)                Splinting        Lcode:        Orthotic Mgmt, Subsequent Enc (63138)        Orthotic Mgmt & Training (19517)                Other:                  Therapeutic Exercise & NMR:  [] (11323) Provided verbal/tactile cueing for activities related to strengthening, flexibility, endurance, ROM  for improvements in scapular, scapulothoracic and UE control with self care, reaching, carrying, lifting, house/yardwork, driving/computer work.    [] (75648) Provided verbal/tactile cueing for activities related to improving balance, coordination, kinesthetic sense, posture, motor skill, proprioception  to assist with  scapular, scapulothoracic and UE control with self care, reaching, carrying, lifting, house/yardwork, driving/computer work.     Therapeutic Activities & NMR:    [] (46962 or 94742) Provided verbal/tactile cueing for activities related to improving balance, coordination, kinesthetic sense, posture, motor skill, proprioception and motor activation to allow for proper function of scapular, scapulothoracic and UE control with self care, carrying, lifting, driving/computer work    Home Exercise Program:    [] (79765) Reviewed/Progressed HEP activities related to strengthening, flexibility, endurance, ROM of scapular, scapulothoracic and UE control with self care, reaching, carrying, lifting, house/yardwork, driving/computer work  [] (12112) Reviewed/Progressed HEP activities related to improving balance, coordination, kinesthetic sense, posture, motor skill, proprioception of scapular, scapulothoracic and UE control with self care, reaching, carrying, lifting, house/yardwork, driving/computer work      Manual Treatments:  PROM / STM / Oscillations-Mobs:  G-I, II, III, IV (PA's, Inf., Post.)  [] (09136) Provided manual therapy to mobilize soft tissue/joints of cervical/CT, scapular GHJ and UE for the purpose of modulating pain, promoting relaxation,  increasing ROM, reducing/eliminating soft tissue swelling/inflammation/restriction, improving soft tissue extensibility and allowing for proper ROM for normal function with self care, reaching, carrying, lifting, house/yardwork, driving/computer work    ADL Training:  [] (60354) Provided self-care/home management training related to activities of daily living and compensatory training, and/or use of adaptive equipment      Charges:  Timed Code Treatment Minutes: 50'   Total Treatment Minutes: 61'   Worker's Comp: Time In/Time Out     [] EVAL (LOW) 15127 (typically 20 minutes face-to-face)    [] EVAL (MOD) 18376 (typically 30 minutes face-to-face)  [] EVAL (HIGH) 93100 (typically 45 minutes face-to-face)  [] OT Re-eval (76093)       [x] Robert [phone]) x 2     [] MVGCY(29976)  [] NMR (03564) x      [] Estim (attended) (95102)   [x] Manual (03891 [facility]) x 1     [x] US (65920)  [] TA (56780) x      [] Paraffin (81664)  [] ADL  (78311) x     [] Splint/L code:    [] Estim (unattended) ([id number])  [x] Fluidotherapy (05054)  [] Other:    Comorbidities Affecting Functional Performance:     []Anxiety (F41.9)/Depression (F32.9)   []Diabetes Type 1(E10.65) or 2 (E11.65)   []Rheumatoid Arthritis (M05.9)  []Fibromyalgia (M79.7)  []Neuropathy(G60.9)  []Osteoarthritis(M19.91)  []None   []Other:    ASSESSMENT:   Pt progressing with AROM and functional use    GOALS: to be able to use wrist and hand. Patient stated goal: To  Use her right arm    []? Progressing: []? Met: []? Not Met: []? Adjusted     Therapist goals for Patient:   Short Term Goals: To be achieved in: 2 weeks  1. Independent in HEP and progression per patient tolerance, in order to prevent re-injury. []? Progressing: [x]? Met: []? Not Met: []? Adjusted   2.  Patient will have a decrease in pain to facilitate improvement in movement, function, and ADLs as indicated by Functional Deficits. []? Progressing: [x]? Met: []? Not Met: []? Adjusted     Long Term Goals to be achieved in 12 weeks (through 6/11/22), including patient directed goals to address patient identified performance deficits:  1) Pt to be independent in graded HEP progression with a good level of effort and compliance. [x]? Progressing: []? Met: []? Not Met: []? Adjusted   2) Pt to report a score of </= 30 % on the Quick DASH disability questionnaire for increased performance with carrying, moving, and handling objects. [x]? Progressing: []? Met: []? Not Met: []? Adjusted   3) Pt will demonstrate increased ROM to [facility] for improved independence with self care, home mgt, and vocational responsibilities. [x]? Progressing: []? Met: []? Not Met: []? Adjusted   4) Pt will demonstrate increased  strength to at least 50% of other hand for improved independence with self care, home mgt, and vocational responsibilities. [x]? Progressing: []? Met: []? Not Met: []? Adjusted   5) Pt will have a decrease in pain to 2/10 to facilitate self care, home mgt, and vocational responsibilities. .  [x]? Progressing: []? Met: []? Not Met: []? Adjusted       Overall Progression Towards Functional Goals/Treatment Progress Update:  [] Patient is progressing as expected towards functional goals listed. [] Progression is slowed due to complexities/impairments listed. [] Progression has been slowed due to co-morbidities.   [x] Plan just implemented, too soon to assess goals progression <30 days  [] Goals require adjustment due to lack of progress  [] Patient is not progressing as expected and requires additional follow up with physician  [] All goals are met  [] Other:     Prognosis for POC: [x] Good [] Fair  [] Poor    Patient requires continued skilled intervention: [x] Yes  [] No    Treatment/Activity Tolerance:  [x] Patient able to complete treatment  [] Patient limited by fatigue  [] Patient limited by pain    [] Patient limited by other medical complications  [] Other:                  PLAN: See eval  [] Continue per plan of care [] Alter current plan (see comments above)  [x] Plan of care initiated [] Hold pending MD visit [] Discharge      Electronically signed by:  Geovanna POLANCO/L [id number]    Note: If patient does not return for scheduled/ recommended follow up visits, this note will serve as a discharge from care along with most recent update on progress.   Phone: [phone]  Fax [phone]

## 2022-04-27 ENCOUNTER — HOSPITAL ENCOUNTER (OUTPATIENT)
Dept: OCCUPATIONAL THERAPY | Age: 72
Setting detail: THERAPIES SERIES
Discharge: HOME | End: 2022-04-27
Payer: MEDICARE

## 2022-04-27 PROCEDURE — 97035 APP MDLTY 1+ULTRASOUND EA 15: CPT | Performed by: OCCUPATIONAL THERAPIST

## 2022-04-27 PROCEDURE — 97110 THERAPEUTIC EXERCISES: CPT | Performed by: OCCUPATIONAL THERAPIST

## 2022-04-27 PROCEDURE — 97140 MANUAL THERAPY 1/> REGIONS: CPT | Performed by: OCCUPATIONAL THERAPIST

## 2022-04-27 PROCEDURE — 97022 WHIRLPOOL THERAPY: CPT | Performed by: OCCUPATIONAL THERAPIST

## 2022-04-27 NOTE — FLOWSHEET NOTE
[address] [place], [address] [place] [address] [address]  Phone: [phone] Fax: [phone]     Occupational Therapy Treatment Note/ Progress Report:     Is this a Progress Report:     [x]  Yes  []  No      If Yes:  Date Range for reporting period:  Beginning 3/16/22    Ending    Progress report will be due (10 Rx or 30 days whichever is less):     Recertification will be due (POC Duration  / 90 days whichever is less): 22   Patient: Elda Page                                    : 1950                                  MRN: [id number]  Referring Physician: Referring Practitioner: [place]                                           Evaluation Date: 3/16/2022                                         Medical Diagnosis Information:  Diagnosis: Z98.890, Z87.81 (ICD-10-CM) - Status post open reduction and internal fixation (ORIF) of fracture;   Treatment Dx:  R wrist pain M25.531                       Date of Injury: 22  Date of Surgery: 22                                  Insurance information:  [facility] and [facility]     Date of Patient follow up with Physician: 22  Has the plan of care been signed (Y/N):        []  Yes  [x]  No       Visit # Insurance Allowable Auth Required   13 BMN []  Yes []  No    From 3/16/22  to 2022      Preferred Language for Healthcare:   [x]English       []other:     Date of Patient follow up with Physician: 22     [x]? Patient reported history, allergies, and medications reviewed - see intake form. Preferred Language for Healthcare:   [x]? English       []? other:            RESTRICTIONS/PRECAUTIONS: post op precautions for Distal radius fx ORIF     Latex Allergy:  [x]? No      []? Yes                    Pacemaker:  []? No       []?  Yes         Functional Scale: 56 % (Quick DASH)                                Date assessed:  3/16/2022        C-SSRS Triggered by Intake questionnaire (Past 2 wk assessment):    No, Questionnaire did not trigger screening.         SUBJECTIVE:     Pt reports US has been helpful in decreasing soreness in ulnar side of hand. Patient reported deficits/history of current problem: Pt reports she tripped over log when walking her dog        OBJECTIVE:   Date:  Hand Dominance:     [x]? Right    []? Left 3/16/2022    3/28/22 4/18/22   Objective Measures:       PAIN 4/10 3/10 intermittent 2/10   Quick DASH 56%     Digits tip to DPFC in cm WFL     Thumb ROM WFL     Thumb opposition  WFL:     Thumb Radial/Palmar abd ROM R:  L:     Wrist ROM Ext/Flex R:38/32  L: 47/52 45/48   Rad/Uln dev ROM NT:     Forearm ROM  Sup/pron R:  L:50/65   62/78    Elbow ROM Ext/flex [facility]     Shoulder Flex  Shoulder Abd  Shoulder IR/ER    WFL     Edema in cm circumf. MCPJs R:  L:     Edema in cm circumf.   Wrist R:  L:      strength in lbs NT:     Pinch Strengthin lbs: lat  R:  L:     Pinch Strength in lbs:  3 point R:  L:        MMT:        Observations:  (including splints, bandages, incisions, scars): Sutures removed; steri strips present            MODALITIES: 4/11/22 4/13/22 4/18/22 4/20/22 4/25/22 4/27/22   Fluidotherapy (44370) 12' 12 10'  10' 10'   Estim (84734/21652)         Paraffin (08499)         US (85530)   8' 8' 8' 8'   Iontophoresis (71952)         Hot Pack         Cold Pack                  INTERVENTIONS:         Pt educ                  UE bike 6' #2 6' 6' #2 6' #2 6' #3 6'    Yellow putty  Rolling, pulling; mallet Yellow putty  Rolling, pulling; mallet Yellow putty  Rolling, pulling; mallet Yellow putty  Rolling, pulling; mallet Yellow putty  Rolling, pulling; mallet Yellow putty  Rolling, pulling; mallet              Red flex bar x20 wrist and forearm Red flex bar x20 wrist and forearm Red flex bar x20 wrist and forearm Red flex bar x20 wrist and forearm X 25       digiflex x 25  Mallet sup/pron x 25                      Power  #1 with sponge    Power  #1 with sponge  Power  #2 x25                               Manual Therapy (82343) STM, scar massage; jt mob STM, scar massage; jt mob STM, scar massage; jt mob STM, scar massage; jt mob STM, scar massage; jt mob STM, scar massage; jt mob   (IASTM, Dry Needling, manual mobilization)                  Splinting         Lcode:         Orthotic Mgmt, Subsequent Enc (27235)         Orthotic Mgmt & Training (79355)                  Other:                    Therapeutic Exercise & NMR:  [] (69283) Provided verbal/tactile cueing for activities related to strengthening, flexibility, endurance, ROM  for improvements in scapular, scapulothoracic and UE control with self care, reaching, carrying, lifting, house/yardwork, driving/computer work.    [] (45596) Provided verbal/tactile cueing for activities related to improving balance, coordination, kinesthetic sense, posture, motor skill, proprioception  to assist with  scapular, scapulothoracic and UE control with self care, reaching, carrying, lifting, house/yardwork, driving/computer work.     Therapeutic Activities & NMR:    [] (66732 or 45415) Provided verbal/tactile cueing for activities related to improving balance, coordination, kinesthetic sense, posture, motor skill, proprioception and motor activation to allow for proper function of scapular, scapulothoracic and UE control with self care, carrying, lifting, driving/computer work    Home Exercise Program:    [] (53034) Reviewed/Progressed HEP activities related to strengthening, flexibility, endurance, ROM of scapular, scapulothoracic and UE control with self care, reaching, carrying, lifting, house/yardwork, driving/computer work  [] (58723) Reviewed/Progressed HEP activities related to improving balance, coordination, kinesthetic sense, posture, motor skill, proprioception of scapular, scapulothoracic and UE control with self care, reaching, carrying, lifting, house/yardwork, driving/computer work      Manual Treatments:  PROM / STM / Oscillations-Mobs:  G-I, II, III, IV (PA's, Inf., Post.)  [] (08888) Provided manual therapy to mobilize soft tissue/joints of cervical/CT, scapular GHJ and UE for the purpose of modulating pain, promoting relaxation,  increasing ROM, reducing/eliminating soft tissue swelling/inflammation/restriction, improving soft tissue extensibility and allowing for proper ROM for normal function with self care, reaching, carrying, lifting, house/yardwork, driving/computer work    ADL Training:  [] (71225) Provided self-care/home management training related to activities of daily living and compensatory training, and/or use of adaptive equipment      Charges:  Timed Code Treatment Minutes: 40'   Total Treatment Minutes: 48'   Worker's Comp: Time In/Time Out     [] EVAL (LOW) 06021 (typically 20 minutes face-to-face)    [] EVAL (MOD) 29574 (typically 30 minutes face-to-face)  [] EVAL (HIGH) 65046 (typically 45 minutes face-to-face)  [] OT Re-eval (84875)       [x] Robert ([id number]) x 1     [] SSQWV(24762)  [] NMR (03277) x      [] Estim (attended) (05399)   [x] Manual ([id number]) x 1     [x] US (44836)  [] TA (46007) x      [] Paraffin (65882)  [] ADL  (52645) x     [] Splint/L code:    [] Estim (unattended) (22 825107)  [x] Fluidotherapy (73882)  [] Other:    Comorbidities Affecting Functional Performance:     []Anxiety (F41.9)/Depression (F32.9)   []Diabetes Type 1(E10.65) or 2 (E11.65)   []Rheumatoid Arthritis (M05.9)  []Fibromyalgia (M79.7)  []Neuropathy(G60.9)  []Osteoarthritis(M19.91)  []None   []Other:    ASSESSMENT:   Pt progressing with AROM and functional use    GOALS: to be able to use wrist and hand. Patient stated goal: To  Use her right arm    []? Progressing: []? Met: []? Not Met: []? Adjusted     Therapist goals for Patient:   Short Term Goals: To be achieved in: 2 weeks  1. Independent in HEP and progression per patient tolerance, in order to prevent re-injury. []? Progressing: [x]? Met: []?  Not Met: []? Adjusted   2. Patient will have a decrease in pain to facilitate improvement in movement, function, and ADLs as indicated by Functional Deficits. []? Progressing: [x]? Met: []? Not Met: []? Adjusted     Long Term Goals to be achieved in 12 weeks (through 6/11/22), including patient directed goals to address patient identified performance deficits:  1) Pt to be independent in graded HEP progression with a good level of effort and compliance. [x]? Progressing: []? Met: []? Not Met: []? Adjusted   2) Pt to report a score of </= 30 % on the Quick DASH disability questionnaire for increased performance with carrying, moving, and handling objects. [x]? Progressing: []? Met: []? Not Met: []? Adjusted   3) Pt will demonstrate increased ROM to [facility] for improved independence with self care, home mgt, and vocational responsibilities. [x]? Progressing: []? Met: []? Not Met: []? Adjusted   4) Pt will demonstrate increased  strength to at least 50% of other hand for improved independence with self care, home mgt, and vocational responsibilities. [x]? Progressing: []? Met: []? Not Met: []? Adjusted   5) Pt will have a decrease in pain to 2/10 to facilitate self care, home mgt, and vocational responsibilities. .  [x]? Progressing: []? Met: []? Not Met: []? Adjusted       Overall Progression Towards Functional Goals/Treatment Progress Update:  [] Patient is progressing as expected towards functional goals listed. [] Progression is slowed due to complexities/impairments listed. [] Progression has been slowed due to co-morbidities.   [x] Plan just implemented, too soon to assess goals progression <30 days  [] Goals require adjustment due to lack of progress  [] Patient is not progressing as expected and requires additional follow up with physician  [] All goals are met  [] Other:     Prognosis for POC: [x] Good [] Fair  [] Poor    Patient requires continued skilled intervention: [x] Yes  [] No    Treatment/Activity Tolerance:  [x] Patient able to complete treatment  [] Patient limited by fatigue  [] Patient limited by pain    [] Patient limited by other medical complications  [] Other:                  PLAN: See eval  [] Continue per plan of care [] Alter current plan (see comments above)  [x] Plan of care initiated [] Hold pending MD visit [] Discharge      Electronically signed by:  Kristen Rousseau OTR/L [id number]    Note: If patient does not return for scheduled/ recommended follow up visits, this note will serve as a discharge from care along with most recent update on progress.   Phone: [phone]  Fax [phone]

## 2022-05-02 ENCOUNTER — HOSPITAL ENCOUNTER (OUTPATIENT)
Dept: OCCUPATIONAL THERAPY | Age: 72
Setting detail: THERAPIES SERIES
Discharge: HOME | End: 2022-05-02
Payer: MEDICARE

## 2022-05-02 PROCEDURE — 97022 WHIRLPOOL THERAPY: CPT | Performed by: OCCUPATIONAL THERAPIST

## 2022-05-02 PROCEDURE — 97140 MANUAL THERAPY 1/> REGIONS: CPT | Performed by: OCCUPATIONAL THERAPIST

## 2022-05-02 PROCEDURE — 97110 THERAPEUTIC EXERCISES: CPT | Performed by: OCCUPATIONAL THERAPIST

## 2022-05-02 PROCEDURE — 97035 APP MDLTY 1+ULTRASOUND EA 15: CPT | Performed by: OCCUPATIONAL THERAPIST

## 2022-05-02 NOTE — FLOWSHEET NOTE
[address] [place] [address],[address] [place], [address] [place] [address] [address]  Phone: [phone] Fax: [phone]     Occupational Therapy Treatment Note/ Progress Report:     Is this a Progress Report:     [x]  Yes  []  No      If Yes:  Date Range for reporting period:  Beginning 3/16/22    Ending    Progress report will be due (10 Rx or 30 days whichever is less):     Recertification will be due (POC Duration  / 90 days whichever is less): 22   Patient: Niall Jordanr                                    : 1950                                  MRN: [id number]  Referring Physician: Referring Practitioner: Mayra Fountain                                           Evaluation Date: 3/16/2022                                         Medical Diagnosis Information:  Diagnosis: Z98.890, Z87.81 (ICD-10-CM) - Status post open reduction and internal fixation (ORIF) of fracture;   Treatment Dx:  R wrist pain M25.531                       Date of Injury: 22  Date of Surgery: 22                                  Insurance information:  [facility] and [facility]     Date of Patient follow up with Physician: 22  Has the plan of care been signed (Y/N):        []  Yes  [x]  No       Visit # Insurance Allowable Auth Required   14 BMN []  Yes []  No    From 3/16/22  to 2022      Preferred Language for Healthcare:   [x]English       []other:     Date of Patient follow up with Physician: 22     [x]? Patient reported history, allergies, and medications reviewed - see intake form. Preferred Language for Healthcare:   [x]? English       []? other:            RESTRICTIONS/PRECAUTIONS: post op precautions for Distal radius fx ORIF     Latex Allergy:  [x]? No      []? Yes                    Pacemaker:  []? No       []?  Yes         Functional Scale: 56 % (Quick DASH)                                Date assessed:  3/16/2022        C-SSRS Triggered by Intake questionnaire (Past 2 wk assessment):    No, Questionnaire did not trigger screening.         SUBJECTIVE:     Pt reports US has been helpful in decreasing soreness in ulnar side of hand. Patient reported deficits/history of current problem: Pt reports she tripped over log when walking her dog        OBJECTIVE:   Date:  Hand Dominance:     [x]? Right    []? Left 3/16/2022    3/28/22 4/18/22   Objective Measures:       PAIN 4/10 3/10 intermittent 2/10   Quick DASH 56%     Digits tip to DPFC in cm WFL     Thumb ROM WFL     Thumb opposition  WFL:     Thumb Radial/Palmar abd ROM R:  L:     Wrist ROM Ext/Flex R:38/32  L: 47/52 45/48   Rad/Uln dev ROM NT:     Forearm ROM  Sup/pron R:  L:50/65   62/78    Elbow ROM Ext/flex [facility]     Shoulder Flex  Shoulder Abd  Shoulder IR/ER    WFL     Edema in cm circumf. MCPJs R:  L:     Edema in cm circumf.   Wrist R:  L:      strength in lbs NT:     Pinch Strengthin lbs: lat  R:  L:     Pinch Strength in lbs:  3 point R:  L:        MMT:        Observations:  (including splints, bandages, incisions, scars): Sutures removed; steri strips present            MODALITIES: 4/11/22 4/13/22 4/18/22 4/20/22 4/25/22 4/27/22 5/2/22   Fluidotherapy (93074) 12' 12 10'  10' 10' 12'   Estim (38179/96073)          Paraffin (48618)          US (48351)   8' 8' 8' 8' 8'   Iontophoresis (08458)          Hot Pack          Cold Pack                    INTERVENTIONS:          Pt educ                    UE bike 6' #2 6' 6' #2 6' #2 6' #3 6' 6'    Yellow putty  Rolling, pulling; mallet Yellow putty  Rolling, pulling; mallet Yellow putty  Rolling, pulling; mallet Yellow putty  Rolling, pulling; mallet Yellow putty  Rolling, pulling; mallet Yellow putty  Rolling, pulling; mallet Yellow putty  Rolling, pulling; mallet               Red flex bar x20 wrist and forearm Red flex bar x20 wrist and forearm Red flex bar x20 wrist and forearm Red flex bar x20 wrist and forearm X 25 Red flex bar   Forearm and wrist 2 x 25       digiflex x 25  Mallet sup/pron x 25                         Power  #1 with sponge    Power  #1 with sponge  Power  #2 x25  Power  #2 x25                                 Manual Therapy (80350) STM, scar massage; jt mob STM, scar massage; jt mob STM, scar massage; jt mob STM, scar massage; jt mob STM, scar massage; jt mob STM, scar massage; jt mob STM, scar massage; jt mob   (IASTM, Dry Needling, manual mobilization)                    Splinting          Lcode:          Orthotic Mgmt, Subsequent Enc (82868)          Orthotic Mgmt & Training (76445)                    Other:                      Therapeutic Exercise & NMR:  [] (78919) Provided verbal/tactile cueing for activities related to strengthening, flexibility, endurance, ROM  for improvements in scapular, scapulothoracic and UE control with self care, reaching, carrying, lifting, house/yardwork, driving/computer work.    [] (64460) Provided verbal/tactile cueing for activities related to improving balance, coordination, kinesthetic sense, posture, motor skill, proprioception  to assist with  scapular, scapulothoracic and UE control with self care, reaching, carrying, lifting, house/yardwork, driving/computer work.     Therapeutic Activities & NMR:    [] (67370 or 96188) Provided verbal/tactile cueing for activities related to improving balance, coordination, kinesthetic sense, posture, motor skill, proprioception and motor activation to allow for proper function of scapular, scapulothoracic and UE control with self care, carrying, lifting, driving/computer work    Home Exercise Program:    [] (09133) Reviewed/Progressed HEP activities related to strengthening, flexibility, endurance, ROM of scapular, scapulothoracic and UE control with self care, reaching, carrying, lifting, house/yardwork, driving/computer work  [] (72725) Reviewed/Progressed HEP activities related to improving balance, coordination, kinesthetic sense, posture, motor skill, proprioception of scapular, scapulothoracic and UE control with self care, reaching, carrying, lifting, house/yardwork, driving/computer work      Manual Treatments:  PROM / STM / Oscillations-Mobs:  G-I, II, III, IV (PA's, Inf., Post.)  [] (72806) Provided manual therapy to mobilize soft tissue/joints of cervical/CT, scapular GHJ and UE for the purpose of modulating pain, promoting relaxation,  increasing ROM, reducing/eliminating soft tissue swelling/inflammation/restriction, improving soft tissue extensibility and allowing for proper ROM for normal function with self care, reaching, carrying, lifting, house/yardwork, driving/computer work    ADL Training:  [] (14793) Provided self-care/home management training related to activities of daily living and compensatory training, and/or use of adaptive equipment      Charges:  Timed Code Treatment Minutes: 40'   Total Treatment Minutes: 46'   Worker's Comp: Time In/Time Out     [] EVAL (LOW) 99865 (typically 20 minutes face-to-face)    [] EVAL (MOD) 71684 (typically 30 minutes face-to-face)  [] EVAL (HIGH) 89750 (typically 45 minutes face-to-face)  [] OT Re-eval (44282)       [x] Robert [phone]) x 1     [] VCQBG(93468)  [] NMR (66621) x      [] Estim (attended) (79963)   [x] Manual ([id number]) x 1     [x] US (81792)  [] TA (38783) x      [] Paraffin (85050)  [] ADL  (55764) x     [] Splint/L code:    [] Estim (unattended) (22 600301)  [x] Fluidotherapy (82443)  [] Other:    Comorbidities Affecting Functional Performance:     []Anxiety (F41.9)/Depression (F32.9)   []Diabetes Type 1(E10.65) or 2 (E11.65)   []Rheumatoid Arthritis (M05.9)  []Fibromyalgia (M79.7)  []Neuropathy(G60.9)  []Osteoarthritis(M19.91)  []None   []Other:    ASSESSMENT:   Pt progressing with AROM and functional use    GOALS: to be able to use wrist and hand. Patient stated goal: To  Use her right arm    []? Progressing: []? Met: []? Not Met: []? Adjusted     Therapist goals for Patient:   Short Term Goals:  To be achieved in: 2 weeks  1. Independent in HEP and progression per patient tolerance, in order to prevent re-injury. []? Progressing: [x]? Met: []? Not Met: []? Adjusted   2. Patient will have a decrease in pain to facilitate improvement in movement, function, and ADLs as indicated by Functional Deficits. []? Progressing: [x]? Met: []? Not Met: []? Adjusted     Long Term Goals to be achieved in 12 weeks (through 6/11/22), including patient directed goals to address patient identified performance deficits:  1) Pt to be independent in graded HEP progression with a good level of effort and compliance. [x]? Progressing: []? Met: []? Not Met: []? Adjusted   2) Pt to report a score of </= 30 % on the Quick DASH disability questionnaire for increased performance with carrying, moving, and handling objects. [x]? Progressing: []? Met: []? Not Met: []? Adjusted   3) Pt will demonstrate increased ROM to [facility] for improved independence with self care, home mgt, and vocational responsibilities. [x]? Progressing: []? Met: []? Not Met: []? Adjusted   4) Pt will demonstrate increased  strength to at least 50% of other hand for improved independence with self care, home mgt, and vocational responsibilities. [x]? Progressing: []? Met: []? Not Met: []? Adjusted   5) Pt will have a decrease in pain to 2/10 to facilitate self care, home mgt, and vocational responsibilities. .  [x]? Progressing: []? Met: []? Not Met: []? Adjusted       Overall Progression Towards Functional Goals/Treatment Progress Update:  [] Patient is progressing as expected towards functional goals listed. [] Progression is slowed due to complexities/impairments listed. [] Progression has been slowed due to co-morbidities.   [x] Plan just implemented, too soon to assess goals progression <30 days  [] Goals require adjustment due to lack of progress  [] Patient is not progressing as expected and requires additional follow up with physician  [] All goals are met  [] Other:     Prognosis for POC: [x] Good [] Fair  [] Poor    Patient requires continued skilled intervention: [x] Yes  [] No    Treatment/Activity Tolerance:  [x] Patient able to complete treatment  [] Patient limited by fatigue  [] Patient limited by pain    [] Patient limited by other medical complications  [] Other:                  PLAN: See eval  [] Continue per plan of care [] Alter current plan (see comments above)  [x] Plan of care initiated [] Hold pending MD visit [] Discharge      Electronically signed by:  Rambo Penn OTR/L [id number]    Note: If patient does not return for scheduled/ recommended follow up visits, this note will serve as a discharge from care along with most recent update on progress.   Phone: [phone]  Fax [phone]

## 2022-05-04 ENCOUNTER — HOSPITAL ENCOUNTER (OUTPATIENT)
Dept: OCCUPATIONAL THERAPY | Age: 72
Setting detail: THERAPIES SERIES
Discharge: HOME | End: 2022-05-04
Payer: MEDICARE

## 2022-05-04 PROCEDURE — 97140 MANUAL THERAPY 1/> REGIONS: CPT | Performed by: OCCUPATIONAL THERAPIST

## 2022-05-04 PROCEDURE — 97110 THERAPEUTIC EXERCISES: CPT | Performed by: OCCUPATIONAL THERAPIST

## 2022-05-04 PROCEDURE — 97022 WHIRLPOOL THERAPY: CPT | Performed by: OCCUPATIONAL THERAPIST

## 2022-05-04 PROCEDURE — 97035 APP MDLTY 1+ULTRASOUND EA 15: CPT | Performed by: OCCUPATIONAL THERAPIST

## 2022-05-04 NOTE — FLOWSHEET NOTE
[address] [place], [address] [place] [address] [address]  Phone: [phone] Fax: [phone]     Occupational Therapy Treatment Note/ Progress Report:     Is this a Progress Report:     [x]  Yes  []  No      If Yes:  Date Range for reporting period:  Beginning 3/16/22    Ending    Progress report will be due (10 Rx or 30 days whichever is less):     Recertification will be due (POC Duration  / 90 days whichever is less): 22   Patient: Jeremy Conrad                                    : 1950                                  MRN: [id number]  Referring Physician: Referring Practitioner: Corine Barahona                                           Evaluation Date: 3/16/2022                                         Medical Diagnosis Information:  Diagnosis: Z98.890, Z87.81 (ICD-10-CM) - Status post open reduction and internal fixation (ORIF) of fracture;   Treatment Dx:  R wrist pain M25.531                       Date of Injury: 22  Date of Surgery: 22                                  Insurance information:   [facility] and [facility]     Date of Patient follow up with Physician: 22  Has the plan of care been signed (Y/N):        []  Yes  [x]  No       Visit # Insurance Allowable Auth Required   15 BMN []  Yes []  No    From 3/16/22  to 2022      Preferred Language for Healthcare:   [x]English       []other:     Date of Patient follow up with Physician: 22     [x]? Patient reported history, allergies, and medications reviewed - see intake form. Preferred Language for Healthcare:   [x]? English       []? other:            RESTRICTIONS/PRECAUTIONS: post op precautions for Distal radius fx ORIF     Latex Allergy:  [x]? No      []? Yes                    Pacemaker:  []? No       []?  Yes         Functional Scale: 56 % (Quick DASH)                                Date assessed:  3/16/2022        C-SSRS Triggered by Intake questionnaire (Past 2 wk assessment):    No, Questionnaire did not trigger screening.         SUBJECTIVE:     Pt reports US has been helpful in decreasing soreness in ulnar side of hand. Patient reported deficits/history of current problem: Pt reports she tripped over log when walking her dog        OBJECTIVE:   Date:  Hand Dominance:     [x]? Right    []? Left 3/16/2022    3/28/22 4/18/22 5/4/22   Objective Measures:        PAIN 4/10 3/10 intermittent 2/10    Quick DASH 56%      Digits tip to DPFC in cm WFL      Thumb ROM WFL      Thumb opposition  WFL:      Thumb Radial/Palmar abd ROM R:  L:      Wrist ROM Ext/Flex R:38/32  L: 47/52 45/48 47/52   Rad/Uln dev ROM NT:      Forearm ROM  Sup/pron R:  L:50/65   62/78    75/88   Elbow ROM Ext/flex [facility]      Shoulder Flex  Shoulder Abd  Shoulder IR/ER    WFL      Edema in cm circumf. MCPJs R:  L:      Edema in cm circumf.   Wrist R:  L:       strength in lbs NT:   R 20#  L46#   Pinch Strengthin lbs: lat  R:  L:      Pinch Strength in lbs:  3 point R:  L:         MMT:         Observations:  (including splints, bandages, incisions, scars): Sutures removed; steri strips present             MODALITIES: 4/18/22 4/20/22 4/25/22 4/27/22 5/2/22 5/4/22   Fluidotherapy (00085) 10'  10' 10' 12' 10'   Estim (58375/50515)         Paraffin (89911)         US (39002) 8' 8' 8' 8' 8' 8'   Iontophoresis (04860)         Hot Pack         Cold Pack                  INTERVENTIONS:         Pt educ                  UE bike 6' #2 6' #2 6' #3 6' 6' #3 6'    Yellow putty  Rolling, pulling; mallet Yellow putty  Rolling, pulling; mallet Yellow putty  Rolling, pulling; mallet Yellow putty  Rolling, pulling; mallet Yellow putty  Rolling, pulling; mallet Yellow putty  Rolling, pulling; mallet             Red flex bar x20 wrist and forearm Red flex bar x20 wrist and forearm Red flex bar x20 wrist and forearm X 25 Red flex bar   Forearm and wrist 2 x 25      digiflex x 25  Mallet sup/pron x 25 Power  #1 with sponge  Power  #2 x25  Power  #2 x25 Power  #1 with sponge                               Manual Therapy (69254) STM, scar massage; jt mob STM, scar massage; jt mob STM, scar massage; jt mob STM, scar massage; jt mob STM, scar massage; jt mob STM, scar massage; jt mob   (IASTM, Dry Needling, manual mobilization)                  Splinting         Lcode:         Orthotic Mgmt, Subsequent Enc (38917)         Orthotic Mgmt & Training (35589)                  Other:                    Therapeutic Exercise & NMR:  [] (77946) Provided verbal/tactile cueing for activities related to strengthening, flexibility, endurance, ROM  for improvements in scapular, scapulothoracic and UE control with self care, reaching, carrying, lifting, house/yardwork, driving/computer work.    [] (12664) Provided verbal/tactile cueing for activities related to improving balance, coordination, kinesthetic sense, posture, motor skill, proprioception  to assist with  scapular, scapulothoracic and UE control with self care, reaching, carrying, lifting, house/yardwork, driving/computer work.     Therapeutic Activities & NMR:    [] (18603 or 20266) Provided verbal/tactile cueing for activities related to improving balance, coordination, kinesthetic sense, posture, motor skill, proprioception and motor activation to allow for proper function of scapular, scapulothoracic and UE control with self care, carrying, lifting, driving/computer work    Home Exercise Program:    [] (44879) Reviewed/Progressed HEP activities related to strengthening, flexibility, endurance, ROM of scapular, scapulothoracic and UE control with self care, reaching, carrying, lifting, house/yardwork, driving/computer work  [] (68023) Reviewed/Progressed HEP activities related to improving balance, coordination, kinesthetic sense, posture, motor skill, proprioception of scapular, scapulothoracic and UE control with self care, reaching, carrying, lifting, house/yardwork, driving/computer work      Manual Treatments:  PROM / STM / Oscillations-Mobs:  G-I, II, III, IV (PA's, Inf., Post.)  [] (38681) Provided manual therapy to mobilize soft tissue/joints of cervical/CT, scapular GHJ and UE for the purpose of modulating pain, promoting relaxation,  increasing ROM, reducing/eliminating soft tissue swelling/inflammation/restriction, improving soft tissue extensibility and allowing for proper ROM for normal function with self care, reaching, carrying, lifting, house/yardwork, driving/computer work    ADL Training:  [] (08765) Provided self-care/home management training related to activities of daily living and compensatory training, and/or use of adaptive equipment      Charges:  Timed Code Treatment Minutes: 40'   Total Treatment Minutes: 46'   Worker's Comp: Time In/Time Out     [] EVAL (LOW) 28064 (typically 20 minutes face-to-face)    [] EVAL (MOD) 26180 (typically 30 minutes face-to-face)  [] EVAL (HIGH) 60493 (typically 45 minutes face-to-face)  [] OT Re-eval (83257)       [x] Robert ([id number]) x 1     [] TTHJV(88000)  [] NMR (90936) x      [] Estim (attended) (34633)   [x] Manual ([address]) x 1     [x] US (18325)  [] TA (36567) x      [] Paraffin (61384)  [] ADL  ([facility]) x     [] Splint/L code:    [] Estim (unattended) (22 549481)  [x] Fluidotherapy (21134)  [] Other:    Comorbidities Affecting Functional Performance:     []Anxiety (F41.9)/Depression (F32.9)   []Diabetes Type 1(E10.65) or 2 (E11.65)   []Rheumatoid Arthritis (M05.9)  []Fibromyalgia (M79.7)  []Neuropathy(G60.9)  []Osteoarthritis(M19.91)  []None   []Other:    ASSESSMENT:   Pt progressing with AROM and  strength. GOALS: to be able to use wrist and hand. Patient stated goal: To  Use her right arm    []? Progressing: []? Met: []? Not Met: []? Adjusted     Therapist goals for Patient:   Short Term Goals: To be achieved in: 2 weeks  1.  Independent in HEP and progression per patient tolerance, in order to prevent re-injury. []? Progressing: [x]? Met: []? Not Met: []? Adjusted   2. Patient will have a decrease in pain to facilitate improvement in movement, function, and ADLs as indicated by Functional Deficits. []? Progressing: [x]? Met: []? Not Met: []? Adjusted     Long Term Goals to be achieved in 12 weeks (through 6/11/22), including patient directed goals to address patient identified performance deficits:  1) Pt to be independent in graded HEP progression with a good level of effort and compliance. [x]? Progressing: []? Met: []? Not Met: []? Adjusted   2) Pt to report a score of </= 30 % on the Quick DASH disability questionnaire for increased performance with carrying, moving, and handling objects. [x]? Progressing: []? Met: []? Not Met: []? Adjusted   3) Pt will demonstrate increased ROM to [facility] for improved independence with self care, home mgt, and vocational responsibilities. [x]? Progressing: []? Met: []? Not Met: []? Adjusted   4) Pt will demonstrate increased  strength to at least 50% of other hand for improved independence with self care, home mgt, and vocational responsibilities. [x]? Progressing: []? Met: []? Not Met: []? Adjusted   5) Pt will have a decrease in pain to 2/10 to facilitate self care, home mgt, and vocational responsibilities. .  [x]? Progressing: []? Met: []? Not Met: []? Adjusted       Overall Progression Towards Functional Goals/Treatment Progress Update:  [] Patient is progressing as expected towards functional goals listed. [] Progression is slowed due to complexities/impairments listed. [] Progression has been slowed due to co-morbidities.   [x] Plan just implemented, too soon to assess goals progression <30 days  [] Goals require adjustment due to lack of progress  [] Patient is not progressing as expected and requires additional follow up with physician  [] All goals are met  [] Other:     Prognosis for POC: [x] Good [] Fair  [] Poor    Patient requires continued skilled intervention: [x] Yes  [] No    Treatment/Activity Tolerance:  [x] Patient able to complete treatment  [] Patient limited by fatigue  [] Patient limited by pain    [] Patient limited by other medical complications  [] Other:                  PLAN: See eval  [] Continue per plan of care [] Alter current plan (see comments above)  [x] Plan of care initiated [] Hold pending MD visit [] Discharge      Electronically signed by:  Areli Boles OTR/L [id number]    Note: If patient does not return for scheduled/ recommended follow up visits, this note will serve as a discharge from care along with most recent update on progress.   Phone: [phone]  Fax [phone]

## 2022-05-09 ENCOUNTER — HOSPITAL ENCOUNTER (OUTPATIENT)
Dept: OCCUPATIONAL THERAPY | Age: 72
Setting detail: THERAPIES SERIES
Discharge: HOME | End: 2022-05-09
Payer: MEDICARE

## 2022-05-09 PROCEDURE — 97018 PARAFFIN BATH THERAPY: CPT | Performed by: OCCUPATIONAL THERAPIST

## 2022-05-09 PROCEDURE — 97022 WHIRLPOOL THERAPY: CPT | Performed by: OCCUPATIONAL THERAPIST

## 2022-05-09 PROCEDURE — 97110 THERAPEUTIC EXERCISES: CPT | Performed by: OCCUPATIONAL THERAPIST

## 2022-05-09 PROCEDURE — 97140 MANUAL THERAPY 1/> REGIONS: CPT | Performed by: OCCUPATIONAL THERAPIST

## 2022-05-09 NOTE — FLOWSHEET NOTE
[address] [place], [address] [place] [address] [address]  Phone: [phone] Fax: [phone]     Occupational Therapy Treatment Note/ Progress Report:     Is this a Progress Report:     [x]  Yes  []  No      If Yes:  Date Range for reporting period:  Beginning 3/16/22    Ending    Progress report will be due (10 Rx or 30 days whichever is less):     Recertification will be due (POC Duration  / 90 days whichever is less): 22   Patient: Dakota Vergara                                    : 1950                                  MRN: [id number]  Referring Physician: Referring Practitioner: Carson Horta                                           Evaluation Date: 3/16/2022                                         Medical Diagnosis Information:  Diagnosis: Z98.890, Z87.81 (ICD-10-CM) - Status post open reduction and internal fixation (ORIF) of fracture;   Treatment Dx:  R wrist pain M25.531                       Date of Injury: 22  Date of Surgery: 22                                  Insurance information:  [facility] and [facility]     Date of Patient follow up with Physician: 22  Has the plan of care been signed (Y/N):        []  Yes  [x]  No       Visit # Insurance Allowable Auth Required   16 BMN []  Yes []  No    From 3/16/22  to 2022      Preferred Language for Healthcare:   [x]English       []other:     Date of Patient follow up with Physician: 22     [x]? Patient reported history, allergies, and medications reviewed - see intake form. Preferred Language for Healthcare:   [x]? English       []? other:            RESTRICTIONS/PRECAUTIONS: post op precautions for Distal radius fx ORIF     Latex Allergy:  [x]? No      []? Yes                    Pacemaker:  []? No       []?  Yes         Functional Scale: 56 % (Quick DASH)                                Date assessed:  3/16/2022        C-SSRS Triggered by Intake questionnaire (Past 2 wk assessment):    No, Questionnaire did not trigger screening.         SUBJECTIVE:     Pt reports she was bitten by something but she is not sure what. Bite area is bruised approx size of dime. 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 Pt also c/o soreness at [facility]. Patient reported deficits/history of current problem: Pt reports she tripped over log when walking her dog        OBJECTIVE:   Date:  Hand Dominance:     [x]? Right    []? Left 3/16/2022    3/28/22 4/18/22 5/4/22   Objective Measures:        PAIN 4/10 3/10 intermittent 2/10    Quick DASH 56%      Digits tip to DPFC in cm WFL      Thumb ROM WFL      Thumb opposition  WFL:      Thumb Radial/Palmar abd ROM R:  L:      Wrist ROM Ext/Flex R:38/32  L: 47/52 45/48 47/52   Rad/Uln dev ROM NT:      Forearm ROM  Sup/pron R:  L:50/65   62/78    75/88   Elbow ROM Ext/flex [facility]      Shoulder Flex  Shoulder Abd  Shoulder IR/ER    WFL      Edema in cm circumf. MCPJs R:  L:      Edema in cm circumf.   Wrist R:  L:       strength in lbs NT:   R 20#  L46#   Pinch Strengthin lbs: lat  R:  L:      Pinch Strength in lbs:  3 point R:  L:         MMT:         Observations:  (including splints, bandages, incisions, scars): Sutures removed; steri strips present             MODALITIES: 4/18/22 4/20/22 4/25/22 4/27/22 5/2/22 5/4/22 5/9/22   Fluidotherapy (20934) 10'  10' 10' 12' 10' 10'   Estim (77850/27914)          Paraffin (11635)       Para + HP   US (65853) 8' 8' 8' 8' 8' 8'    Iontophoresis (60271)          Hot Pack          Cold Pack                    INTERVENTIONS:          Pt educ                    UE bike 6' #2 6' #2 6' #3 6' 6' #3 6'     Yellow putty  Rolling, pulling; mallet Yellow putty  Rolling, pulling; mallet Yellow putty  Rolling, pulling; mallet Yellow putty  Rolling, pulling; mallet Yellow putty  Rolling, pulling; mallet Yellow putty  Rolling, pulling; mallet               Red flex bar x20 wrist and forearm Red flex bar x20 wrist and forearm Red flex bar x20 wrist and forearm X 25 Red flex bar   Forearm and wrist 2 x 25  Red flex bar   Forearm and wrist 2 x 25     digiflex x 25  Mallet sup/pron x 25             HH multiple red bands               Power  #1 with sponge  Power  #2 x25  Power  #2 x25 Power  #1 with sponge                      kinesiotape at wrist             Manual Therapy (97870) STM, scar massage; jt mob STM, scar massage; jt mob STM, scar massage; jt mob STM, scar massage; jt mob STM, scar massage; jt mob STM, scar massage; jt mob STM, scar massage; jt mob   (IASTM, Dry Needling, manual mobilization)                    Splinting          Lcode:          Orthotic Mgmt, Subsequent Enc (98002)          Orthotic Mgmt & Training (17465)                    Other:                      Therapeutic Exercise & NMR:  [] (19369) Provided verbal/tactile cueing for activities related to strengthening, flexibility, endurance, ROM  for improvements in scapular, scapulothoracic and UE control with self care, reaching, carrying, lifting, house/yardwork, driving/computer work.    [] (52509) Provided verbal/tactile cueing for activities related to improving balance, coordination, kinesthetic sense, posture, motor skill, proprioception  to assist with  scapular, scapulothoracic and UE control with self care, reaching, carrying, lifting, house/yardwork, driving/computer work.     Therapeutic Activities & NMR:    [] (92302 or 89702) Provided verbal/tactile cueing for activities related to improving balance, coordination, kinesthetic sense, posture, motor skill, proprioception and motor activation to allow for proper function of scapular, scapulothoracic and UE control with self care, carrying, lifting, driving/computer work    Home Exercise Program:    [] (39954) Reviewed/Progressed HEP activities related to strengthening, flexibility, endurance, ROM of scapular, scapulothoracic and UE control with self care, reaching, carrying, lifting, house/yardwork, driving/computer work  [] (26952) Reviewed/Progressed HEP activities related to improving balance, coordination, kinesthetic sense, posture, motor skill, proprioception of scapular, scapulothoracic and UE control with self care, reaching, carrying, lifting, house/yardwork, driving/computer work      Manual Treatments:  PROM / STM / Oscillations-Mobs:  G-I, II, III, IV (PA's, Inf., Post.)  [] ([id number]) Provided manual therapy to mobilize soft tissue/joints of cervical/CT, scapular GHJ and UE for the purpose of modulating pain, promoting relaxation,  increasing ROM, reducing/eliminating soft tissue swelling/inflammation/restriction, improving soft tissue extensibility and allowing for proper ROM for normal function with self care, reaching, carrying, lifting, house/yardwork, driving/computer work    ADL Training:  [] (28747) Provided self-care/home management training related to activities of daily living and compensatory training, and/or use of adaptive equipment      Charges:  Timed Code Treatment Minutes: 30'   Total Treatment Minutes: 54''   Worker's Comp: Time In/Time Out     [] EVAL (LOW) 59972 (typically 20 minutes face-to-face)    [] EVAL (MOD) 19462 (typically 30 minutes face-to-face)  [] EVAL (HIGH) [phone] (typically 45 minutes face-to-face)  [] OT Re-eval (03910)       [x] Robert [phone]) x 1     [] DEIVO(63730)  [] NMR (88830) x      [] Estim (attended) (95712)   [x] Manual ([id number]) x 1     [] US (63340)  [] TA (56985) x      [x] Paraffin (23635)  [] ADL  (46 [phone] 70 60) x     [] Splint/L code:    [] Estim (unattended) (22 019953)  [x] Fluidotherapy (10940)  [] Other:    Comorbidities Affecting Functional Performance:     []Anxiety (F41.9)/Depression (F32.9)   []Diabetes Type 1(E10.65) or 2 (E11.65)   []Rheumatoid Arthritis (M05.9)  []Fibromyalgia (M79.7)  []Neuropathy(G60.9)  []Osteoarthritis(M19.91)  []None   []Other:    ASSESSMENT:   Pt progressing with AROM and  strength.     GOALS: to be able to use wrist and hand. Patient stated goal: To  Use her right arm    []? Progressing: []? Met: []? Not Met: []? Adjusted     Therapist goals for Patient:   Short Term Goals: To be achieved in: 2 weeks  1. Independent in HEP and progression per patient tolerance, in order to prevent re-injury. []? Progressing: [x]? Met: []? Not Met: []? Adjusted   2. Patient will have a decrease in pain to facilitate improvement in movement, function, and ADLs as indicated by Functional Deficits. []? Progressing: [x]? Met: []? Not Met: []? Adjusted     Long Term Goals to be achieved in 12 weeks (through 6/11/22), including patient directed goals to address patient identified performance deficits:  1) Pt to be independent in graded HEP progression with a good level of effort and compliance. [x]? Progressing: []? Met: []? Not Met: []? Adjusted   2) Pt to report a score of </= 30 % on the Quick DASH disability questionnaire for increased performance with carrying, moving, and handling objects. [x]? Progressing: []? Met: []? Not Met: []? Adjusted   3) Pt will demonstrate increased ROM to [facility] for improved independence with self care, home mgt, and vocational responsibilities. [x]? Progressing: []? Met: []? Not Met: []? Adjusted   4) Pt will demonstrate increased  strength to at least 50% of other hand for improved independence with self care, home mgt, and vocational responsibilities. [x]? Progressing: []? Met: []? Not Met: []? Adjusted   5) Pt will have a decrease in pain to 2/10 to facilitate self care, home mgt, and vocational responsibilities. .  [x]? Progressing: []? Met: []? Not Met: []? Adjusted       Overall Progression Towards Functional Goals/Treatment Progress Update:  [] Patient is progressing as expected towards functional goals listed. [] Progression is slowed due to complexities/impairments listed. [] Progression has been slowed due to co-morbidities.   [x] Plan just implemented, too soon to assess goals progression <30 days  [] Goals require adjustment due to lack of progress  [] Patient is not progressing as expected and requires additional follow up with physician  [] All goals are met  [] Other:     Prognosis for POC: [x] Good [] Fair  [] Poor    Patient requires continued skilled intervention: [x] Yes  [] No    Treatment/Activity Tolerance:  [x] Patient able to complete treatment  [] Patient limited by fatigue  [] Patient limited by pain    [] Patient limited by other medical complications  [] Other:                  PLAN: See eval  [] Continue per plan of care [] Alter current plan (see comments above)  [x] Plan of care initiated [] Hold pending MD visit [] Discharge      Electronically signed by:  Cece Martinez OTR/L [id number]    Note: If patient does not return for scheduled/ recommended follow up visits, this note will serve as a discharge from care along with most recent update on progress.   Phone: [phone]  Fax [phone]

## 2022-05-11 ENCOUNTER — HOSPITAL ENCOUNTER (OUTPATIENT)
Dept: OCCUPATIONAL THERAPY | Age: 72
Setting detail: THERAPIES SERIES
Discharge: HOME | End: 2022-05-11
Payer: MEDICARE

## 2022-05-11 PROCEDURE — 97140 MANUAL THERAPY 1/> REGIONS: CPT | Performed by: OCCUPATIONAL THERAPIST

## 2022-05-11 PROCEDURE — 97110 THERAPEUTIC EXERCISES: CPT | Performed by: OCCUPATIONAL THERAPIST

## 2022-05-11 PROCEDURE — 97022 WHIRLPOOL THERAPY: CPT | Performed by: OCCUPATIONAL THERAPIST

## 2022-05-11 PROCEDURE — 97018 PARAFFIN BATH THERAPY: CPT | Performed by: OCCUPATIONAL THERAPIST

## 2022-05-11 NOTE — FLOWSHEET NOTE
[address] [place], [address] [place] [address] [address]  Phone: [phone] Fax: [phone]     Occupational Therapy Treatment Note/ Progress Report:     Is this a Progress Report:     [x]  Yes  []  No      If Yes:  Date Range for reporting period:  Beginning 3/16/22    Ending    Progress report will be due (10 Rx or 30 days whichever is less): 3/58/11    Recertification will be due (POC Duration  / 90 days whichever is less): 22   Patient: Susana Herrera                                    : 1950                                  MRN: [id number]  Referring Physician: Referring Practitioner: Tej Herrera                                           Evaluation Date: 3/16/2022                                         Medical Diagnosis Information:  Diagnosis: Z98.890, Z87.81 (ICD-10-CM) - Status post open reduction and internal fixation (ORIF) of fracture;   Treatment Dx:  R wrist pain M25.531                       Date of Injury: 22  Date of Surgery: 22                                  Insurance information:  [facility] and [facility]     Date of Patient follow up with Physician: 22  Has the plan of care been signed (Y/N):        []  Yes  [x]  No       Visit # Insurance Allowable Auth Required   17 BMN []  Yes []  No    From 3/16/22  to 2022      Preferred Language for Healthcare:   [x]English       []other:     Date of Patient follow up with Physician: 22     [x]? Patient reported history, allergies, and medications reviewed - see intake form. Preferred Language for Healthcare:   [x]? English       []? other:            RESTRICTIONS/PRECAUTIONS: Pt still wearing wrist brace until next MD visit as 6 wk postop visit indicated slow healing of fx.      Latex Allergy:  [x]? No      []? Yes                    Pacemaker:  []? No       []?  Yes         Functional Scale: 56 % (Quick DASH)                                Date assessed: 3/16/2022        C-SSRS Triggered by Intake questionnaire (Past 2 wk assessment):    No, Questionnaire did not trigger screening.      Subjective:  Pt reports the kinesiotape was helpful in reducing soreness at DRUJ   Patient reported deficits/history of current problem: Pt reports she tripped over log when walking her dog        OBJECTIVE:   Date:  Hand Dominance:     [x]? Right    []? Left 3/16/2022    3/28/22 4/18/22 5/4/22   Objective Measures:        PAIN 4/10 3/10 intermittent 2/10    Quick DASH 56%      Digits tip to DPFC in cm WFL      Thumb ROM WFL      Thumb opposition  WFL:      Thumb Radial/Palmar abd ROM R:  L:      Wrist ROM Ext/Flex R:38/32  L: 47/52 45/48 47/52   Rad/Uln dev ROM NT:      Forearm ROM  Sup/pron R:  L:50/65   62/78    75/88   Elbow ROM Ext/flex [facility]      Shoulder Flex  Shoulder Abd  Shoulder IR/ER    WFL      Edema in cm circumf. MCPJs R:  L:      Edema in cm circumf.   Wrist R:  L:       strength in lbs NT:   R 20#  L46#   Pinch Strengthin lbs: lat  R:  L:      Pinch Strength in lbs:  3 point R:  L:         MMT:         Observations:  (including splints, bandages, incisions, scars): Sutures removed; steri strips present             MODALITIES: 4/25/22 4/27/22 5/2/22 5/4/22 5/9/22 5/11/22   Fluidotherapy (03714) 10' 10' 12' 10' 10' 10'  At the end of tx   Estim (04789/06780)         Paraffin (72359)     Para + HP 10'   US (69218) 8' 8' 8' 8'     Iontophoresis (96098)         Hot Pack         Cold Pack                  INTERVENTIONS:         Pt educ                  UE bike #2 6' #3 6' 6' #3 6'  UE bike  #3 6'    Yellow putty  Rolling, pulling; mallet Yellow putty  Rolling, pulling; mallet Yellow putty  Rolling, pulling; mallet Yellow putty  Rolling, pulling; mallet  Yellow putty  Rolling, pulling; mallet             Red flex bar x20 wrist and forearm X 25 Red flex bar   Forearm and wrist 2 x 25  Red flex bar   Forearm and wrist 2 x 25 Red flex bar   Forearm and wrist 2 x 25 Mallet sup/pron x 25            HH multiple red bands              Power  #2 x25  Power  #2 x25 Power  #1 with sponge                    kinesiotape at wrist kinesiotape at wrist            Manual Therapy (43763) STM, scar massage; jt mob STM, scar massage; jt mob STM, scar massage; jt mob STM, scar massage; jt mob STM, scar massage; jt mob STM, scar massage; jt mob   (IASTM, Dry Needling, manual mobilization)                  Splinting         Lcode:         Orthotic Mgmt, Subsequent Enc (63604)         Orthotic Mgmt & Training (30994)                  Other:                    Therapeutic Exercise & NMR:  [] (94173) Provided verbal/tactile cueing for activities related to strengthening, flexibility, endurance, ROM  for improvements in scapular, scapulothoracic and UE control with self care, reaching, carrying, lifting, house/yardwork, driving/computer work.    [] (08480) Provided verbal/tactile cueing for activities related to improving balance, coordination, kinesthetic sense, posture, motor skill, proprioception  to assist with  scapular, scapulothoracic and UE control with self care, reaching, carrying, lifting, house/yardwork, driving/computer work.     Therapeutic Activities & NMR:    [] (59597 or 34509) Provided verbal/tactile cueing for activities related to improving balance, coordination, kinesthetic sense, posture, motor skill, proprioception and motor activation to allow for proper function of scapular, scapulothoracic and UE control with self care, carrying, lifting, driving/computer work    Home Exercise Program:    [] (78336) Reviewed/Progressed HEP activities related to strengthening, flexibility, endurance, ROM of scapular, scapulothoracic and UE control with self care, reaching, carrying, lifting, house/yardwork, driving/computer work  [] (72948) Reviewed/Progressed HEP activities related to improving balance, coordination, kinesthetic sense, posture, motor skill, proprioception of scapular, scapulothoracic and UE control with self care, reaching, carrying, lifting, house/yardwork, driving/computer work      Manual Treatments:  PROM / STM / Oscillations-Mobs:  G-I, II, III, IV (PA's, Inf., Post.)  [] (20338) Provided manual therapy to mobilize soft tissue/joints of cervical/CT, scapular GHJ and UE for the purpose of modulating pain, promoting relaxation,  increasing ROM, reducing/eliminating soft tissue swelling/inflammation/restriction, improving soft tissue extensibility and allowing for proper ROM for normal function with self care, reaching, carrying, lifting, house/yardwork, driving/computer work    ADL Training:  [] (89429) Provided self-care/home management training related to activities of daily living and compensatory training, and/or use of adaptive equipment      Charges:  Timed Code Treatment Minutes: 30'   Total Treatment Minutes: 50''   Worker's Comp: Time In/Time Out     [] EVAL (LOW) 21711 (typically 20 minutes face-to-face)    [] EVAL (MOD) 74074 (typically 30 minutes face-to-face)  [] EVAL (HIGH) 05098 (typically 45 minutes face-to-face)  [] OT Re-eval (21872)       [x] Robert [phone]) x 1     [] BSQEC(77003)  [] NMR (93986) x      [] Estim (attended) (50739)   [x] Manual ([phone]) x 1     [] US (62683)  [] TA (40416) x      [x] Paraffin (70321)  [] ADL  ([phone]) x     [] Splint/L code:    [] Estim (unattended) ([phone])  [x] Fluidotherapy (84341)  [] Other:    Comorbidities Affecting Functional Performance:     []Anxiety (F41.9)/Depression (F32.9)   []Diabetes Type 1(E10.65) or 2 (E11.65)   []Rheumatoid Arthritis (M05.9)  []Fibromyalgia (M79.7)  []Neuropathy(G60.9)  []Osteoarthritis(M19.91)  []None   []Other:    ASSESSMENT:   Pt progressing with AROM and  strength. GOALS: to be able to use wrist and hand. Patient stated goal: To  Use her right arm    []? Progressing: []? Met: []? Not Met: []? Adjusted     Therapist goals for Patient:   Short Term Goals:  To be achieved in: 2 weeks  1. Independent in HEP and progression per patient tolerance, in order to prevent re-injury. []? Progressing: [x]? Met: []? Not Met: []? Adjusted   2. Patient will have a decrease in pain to facilitate improvement in movement, function, and ADLs as indicated by Functional Deficits. []? Progressing: [x]? Met: []? Not Met: []? Adjusted     Long Term Goals to be achieved in 12 weeks (through 6/11/22), including patient directed goals to address patient identified performance deficits:  1) Pt to be independent in graded HEP progression with a good level of effort and compliance. [x]? Progressing: []? Met: []? Not Met: []? Adjusted   2) Pt to report a score of </= 30 % on the Quick DASH disability questionnaire for increased performance with carrying, moving, and handling objects. [x]? Progressing: []? Met: []? Not Met: []? Adjusted   3) Pt will demonstrate increased ROM to [facility] for improved independence with self care, home mgt, and vocational responsibilities. [x]? Progressing: []? Met: []? Not Met: []? Adjusted   4) Pt will demonstrate increased  strength to at least 50% of other hand for improved independence with self care, home mgt, and vocational responsibilities. [x]? Progressing: []? Met: []? Not Met: []? Adjusted   5) Pt will have a decrease in pain to 2/10 to facilitate self care, home mgt, and vocational responsibilities. .  [x]? Progressing: []? Met: []? Not Met: []? Adjusted       Overall Progression Towards Functional Goals/Treatment Progress Update:  [] Patient is progressing as expected towards functional goals listed. [] Progression is slowed due to complexities/impairments listed. [] Progression has been slowed due to co-morbidities.   [x] Plan just implemented, too soon to assess goals progression <30 days  [] Goals require adjustment due to lack of progress  [] Patient is not progressing as expected and requires additional follow up with physician  [] All goals are met  [] Other:     Prognosis for POC: [x] Good [] Fair  [] Poor    Patient requires continued skilled intervention: [x] Yes  [] No    Treatment/Activity Tolerance:  [x] Patient able to complete treatment  [] Patient limited by fatigue  [] Patient limited by pain    [] Patient limited by other medical complications  [] Other:                  PLAN: See eval  [] Continue per plan of care [] Alter current plan (see comments above)  [x] Plan of care initiated [] Hold pending MD visit [] Discharge      Electronically signed by:  Duong POLANCO/L [id number]    Note: If patient does not return for scheduled/ recommended follow up visits, this note will serve as a discharge from care along with most recent update on progress.   Phone: [phone]  Fax [phone]

## 2022-05-16 ENCOUNTER — APPOINTMENT (OUTPATIENT)
Dept: OCCUPATIONAL THERAPY | Age: 72
End: 2022-05-16
Payer: MEDICARE

## 2022-05-18 ENCOUNTER — APPOINTMENT (OUTPATIENT)
Dept: OCCUPATIONAL THERAPY | Age: 72
End: 2022-05-18
Payer: MEDICARE

## 2022-05-23 ENCOUNTER — HOSPITAL ENCOUNTER (OUTPATIENT)
Dept: OCCUPATIONAL THERAPY | Age: 72
Setting detail: THERAPIES SERIES
Discharge: HOME | End: 2022-05-23
Payer: MEDICARE

## 2022-05-23 ENCOUNTER — OFFICE VISIT (OUTPATIENT)
Dept: ORTHOPEDIC SURGERY | Age: 72
End: 2022-05-23

## 2022-05-23 VITALS — BODY MASS INDEX: 24.55 KG/M2 | HEIGHT: 61 IN | WEIGHT: 130 LBS

## 2022-05-23 DIAGNOSIS — S62.101D CLOSED FRACTURE OF RIGHT WRIST WITH ROUTINE HEALING, SUBSEQUENT ENCOUNTER: Primary | ICD-10-CM

## 2022-05-23 PROCEDURE — G8420 CALC BMI NORM PARAMETERS: HCPCS | Performed by: PHYSICIAN ASSISTANT

## 2022-05-23 PROCEDURE — G8400 PT W/DXA NO RESULTS DOC: HCPCS | Performed by: PHYSICIAN ASSISTANT

## 2022-05-23 PROCEDURE — 99213 OFFICE O/P EST LOW 20 MIN: CPT | Performed by: PHYSICIAN ASSISTANT

## 2022-05-23 PROCEDURE — 1036F TOBACCO NON-USER: CPT | Performed by: PHYSICIAN ASSISTANT

## 2022-05-23 PROCEDURE — G8427 DOCREV CUR MEDS BY ELIG CLIN: HCPCS | Performed by: PHYSICIAN ASSISTANT

## 2022-05-23 PROCEDURE — 3017F COLORECTAL CA SCREEN DOC REV: CPT | Performed by: PHYSICIAN ASSISTANT

## 2022-05-23 PROCEDURE — 1090F PRES/ABSN URINE INCON ASSESS: CPT | Performed by: PHYSICIAN ASSISTANT

## 2022-05-23 PROCEDURE — 1123F ACP DISCUSS/DSCN MKR DOCD: CPT | Performed by: PHYSICIAN ASSISTANT

## 2022-05-23 NOTE — PROGRESS NOTES
ORTHOPAEDIC SURGERY FOLLOWUP VISIT     CHIEF COMPLAINT:  Right wrist     DATE OF INJURY: 2/16/2022  DIAGNOSIS: Right distal radius fracture  DATE OF SURGERY: 2/24/2022, ORIF right distal radius fracture     HISTORY OF PRESENT ILLNESS:  67-year-old right-hand-dominant female presents for evaluation of a right distal radius fracture. She is 6 weeks from ORIF. She indicates that her pain is 2 out of 10. She states that her pain has improved tremendously after surgery. She denies numbness or tingling. She has not had any fevers, chills, or night sweats. She has been compliant with weightbearing restrictions. She has had some stiffness to her fingers. She is attending occupational therapy and feels this is going well.   Pain Assessment  Location of Pain: Wrist  Location Modifiers: Right  Severity of Pain: 2  Quality of Pain: Dull,Aching  Duration of Pain: Persistent  Frequency of Pain: Constant  Aggravating Factors: Other (Comment)  Limiting Behavior: Yes  Relieving Factors: Rest  Result of Injury: Yes  Work-Related Injury: No  Are there other pain locations you wish to document?: No]  PHYSICAL EXAM:  General: Well-appearing female of stated age. No acute distress. Right upper extremity: No surrounding erythema. There is minimal tenderness to palpation about the wound. She has 90 degrees supination and nearly 90 degrees pronation. There is mild limitation to radial/ulnar deviation. There is no significant pain at extremes of motion. Flexion/extension does not cause significant pain. She is able to make a 100% fist.  Sensation is intact to light touch in median, radial, ulnar, and axillary distributions. Motor function is intact to AIN, PIN, ulnar motor nerves. There is a strong palpable radial pulse, and brisk capillary refill to the fingers.   Compartments are soft and compressible     RADIOGRAPHIC EXAM:  4 views of the right wrist including PA, oblique, lateral, and 20 degree inclined lateral x-rays demonstrate maintained alignment of distal radius fracture with evidence of interval healing. There is maturing callus about the radial and ulnar aspects of the distal radius. There is 1 mm of ulnar positive variance as result of minor height loss. There is a nonunited ulnar styloid fragment. There is maturing callus noted particularly about the dorsal distal radius consolidation of the fracture line. .  No evidence of intra-articular hardware. No significant degenerative changes. Distal radial ulnar joint appears appropriately positioned. ASSESSMENT AND PLAN:  64-year-old right-hand-dominant female 6 weeks status post ORIF right distal radius fracture     Doing very well. She continues to make progress with occupational therapy and she is progressing satisfactorily with her home range of motion program.  She has continued to wear the brace even though Dr. Guanako Akers last note states that she should wean out of her brace. She will begin to wean herself from the removable wrist brace. I recommended that she avoid heavy lifting at least for another month. She will use pain as a guide to activity. She can use ice and elevation if there is soreness/swelling.   I would recommend that she return to clinic in 6 weeks for repeat x-rays, sooner if there are problems.     Rainer Mata PA-C  Board certified by the [address] [place]

## 2022-06-29 ENCOUNTER — OFFICE VISIT (OUTPATIENT)
Dept: ORTHOPEDIC SURGERY | Age: 72
End: 2022-06-29

## 2022-06-29 VITALS — WEIGHT: 130 LBS | HEIGHT: 61 IN | BODY MASS INDEX: 24.55 KG/M2

## 2022-06-29 DIAGNOSIS — Z98.890 STATUS POST OPEN REDUCTION AND INTERNAL FIXATION (ORIF) OF FRACTURE: ICD-10-CM

## 2022-06-29 DIAGNOSIS — Z87.81 STATUS POST OPEN REDUCTION AND INTERNAL FIXATION (ORIF) OF FRACTURE: ICD-10-CM

## 2022-06-29 DIAGNOSIS — S62.101D CLOSED FRACTURE OF RIGHT WRIST WITH ROUTINE HEALING, SUBSEQUENT ENCOUNTER: Primary | ICD-10-CM

## 2022-06-29 PROCEDURE — G8400 PT W/DXA NO RESULTS DOC: HCPCS | Performed by: PHYSICIAN ASSISTANT

## 2022-06-29 PROCEDURE — 1123F ACP DISCUSS/DSCN MKR DOCD: CPT | Performed by: PHYSICIAN ASSISTANT

## 2022-06-29 PROCEDURE — 99212 OFFICE O/P EST SF 10 MIN: CPT | Performed by: PHYSICIAN ASSISTANT

## 2022-06-29 PROCEDURE — 1090F PRES/ABSN URINE INCON ASSESS: CPT | Performed by: PHYSICIAN ASSISTANT

## 2022-06-29 PROCEDURE — 3017F COLORECTAL CA SCREEN DOC REV: CPT | Performed by: PHYSICIAN ASSISTANT

## 2022-06-29 PROCEDURE — G8427 DOCREV CUR MEDS BY ELIG CLIN: HCPCS | Performed by: PHYSICIAN ASSISTANT

## 2022-06-29 PROCEDURE — 1036F TOBACCO NON-USER: CPT | Performed by: PHYSICIAN ASSISTANT

## 2022-06-29 PROCEDURE — G8420 CALC BMI NORM PARAMETERS: HCPCS | Performed by: PHYSICIAN ASSISTANT

## 2022-06-29 NOTE — PROGRESS NOTES
ORTHOPAEDIC SURGERY FOLLOWUP VISIT     CHIEF COMPLAINT:  Right wrist     DATE OF INJURY: 2/16/2022  DIAGNOSIS: Right distal radius fracture  DATE OF SURGERY: 2/24/2022, ORIF right distal radius fracture     HISTORY OF PRESENT ILLNESS:  67-year-old right-hand-dominant female presents for evaluation of a right distal radius fracture. She is 4 months from ORIF. She indicates that her pain is 0-2 out of 10. She states that her pain has improved tremendously after surgery. She denies numbness or tingling. She has not had any fevers, chills, or night sweats. She has been compliant with weightbearing restrictions. She has had some stiffness to her fingers. She is attending occupational therapy and feels this is going well.   Pain Assessment  Location of Pain: Wrist  Location Modifiers: Right  Severity of Pain: 4  Quality of Pain: Other (Comment)  Frequency of Pain: Intermittent]  PHYSICAL EXAM:  General: Well-appearing female of stated age. No acute distress. Right upper extremity: No surrounding erythema. There is minimal tenderness to palpation about the wound. She has 90 degrees supination and nearly 90 degrees pronation. There is mild limitation to radial/ulnar deviation. There is no significant pain at extremes of motion. Flexion/extension does not cause significant pain. She is able to make a 100% fist.  Sensation is intact to light touch in median, radial, ulnar, and axillary distributions. Motor function is intact to AIN, PIN, ulnar motor nerves. There is a strong palpable radial pulse, and brisk capillary refill to the fingers. Compartments are soft and compressible     RADIOGRAPHIC EXAM:  4 views of the right wrist including PA, oblique, lateral, and 20 degree inclined lateral x-rays demonstrate maintained alignment of distal radius fracture with evidence of interval healing. There is maturing callus about the radial and ulnar aspects of the distal radius.   There is 1 mm of ulnar positive variance as result of minor height loss. There is a nonunited ulnar styloid fragment. There is maturing callus noted particularly about the dorsal distal radius consolidation of the fracture line. .  No evidence of intra-articular hardware. No significant degenerative changes. Distal radial ulnar joint appears appropriately positioned. ASSESSMENT AND PLAN:  80-year-old right-hand-dominant female 6 weeks status post ORIF right distal radius fracture     Doing very well. Patient is to continue with her range of motion exercises and may return to all her activities as tolerated by pain.     Follow-up: As needed     Pati Oppenheim, PA-C  Board certified by the [address] [place]

## 2022-11-04 ENCOUNTER — HOSPITAL ENCOUNTER (OUTPATIENT)
Dept: WOMENS IMAGING | Age: 72
Discharge: HOME | End: 2022-11-04
Payer: MEDICARE

## 2022-11-04 DIAGNOSIS — Z12.31 ENCOUNTER FOR SCREENING MAMMOGRAM FOR BREAST CANCER: ICD-10-CM

## 2022-11-04 PROCEDURE — 77063 BREAST TOMOSYNTHESIS BI: CPT

## 2023-07-11 ENCOUNTER — HOSPITAL ENCOUNTER (EMERGENCY)
Age: 73
Discharge: HOME | End: 2023-07-11
Attending: STUDENT IN AN ORGANIZED HEALTH CARE EDUCATION/TRAINING PROGRAM
Payer: MEDICARE

## 2023-07-11 ENCOUNTER — APPOINTMENT (OUTPATIENT)
Dept: CT IMAGING | Age: 73
End: 2023-07-11
Payer: MEDICARE

## 2023-07-11 VITALS
WEIGHT: 130 LBS | HEIGHT: 60 IN | SYSTOLIC BLOOD PRESSURE: 140 MMHG | RESPIRATION RATE: 16 BRPM | DIASTOLIC BLOOD PRESSURE: 86 MMHG | BODY MASS INDEX: 25.52 KG/M2 | OXYGEN SATURATION: 96 % | TEMPERATURE: 98.6 F | HEART RATE: 72 BPM

## 2023-07-11 DIAGNOSIS — S01.81XA LACERATION OF EYEBROW AND FOREHEAD, RIGHT, INITIAL ENCOUNTER: Primary | ICD-10-CM

## 2023-07-11 DIAGNOSIS — S09.90XA CLOSED HEAD INJURY, INITIAL ENCOUNTER: ICD-10-CM

## 2023-07-11 DIAGNOSIS — S01.111A LACERATION OF EYEBROW AND FOREHEAD, RIGHT, INITIAL ENCOUNTER: Primary | ICD-10-CM

## 2023-07-11 PROCEDURE — 90715 TDAP VACCINE 7 YRS/> IM: CPT | Performed by: STUDENT IN AN ORGANIZED HEALTH CARE EDUCATION/TRAINING PROGRAM

## 2023-07-11 PROCEDURE — 99284 EMERGENCY DEPT VISIT MOD MDM: CPT

## 2023-07-11 PROCEDURE — 6360000002 HC RX W HCPCS: Performed by: STUDENT IN AN ORGANIZED HEALTH CARE EDUCATION/TRAINING PROGRAM

## 2023-07-11 PROCEDURE — 70450 CT HEAD/BRAIN W/O DYE: CPT

## 2023-07-11 PROCEDURE — 2500000003 HC RX 250 WO HCPCS: Performed by: STUDENT IN AN ORGANIZED HEALTH CARE EDUCATION/TRAINING PROGRAM

## 2023-07-11 PROCEDURE — 12052 INTMD RPR FACE/MM 2.6-5.0 CM: CPT

## 2023-07-11 PROCEDURE — 90471 IMMUNIZATION ADMIN: CPT | Performed by: STUDENT IN AN ORGANIZED HEALTH CARE EDUCATION/TRAINING PROGRAM

## 2023-07-11 RX ORDER — LIDOCAINE HYDROCHLORIDE AND EPINEPHRINE 10; 10 MG/ML; UG/ML
20 INJECTION, SOLUTION INFILTRATION; PERINEURAL ONCE
Status: COMPLETED | OUTPATIENT
Start: 2023-07-11 | End: 2023-07-11

## 2023-07-11 RX ADMIN — TETANUS TOXOID, REDUCED DIPHTHERIA TOXOID AND ACELLULAR PERTUSSIS VACCINE, ADSORBED 0.5 ML: 5; 2.5; 8; 8; 2.5 SUSPENSION INTRAMUSCULAR at 20:39

## 2023-07-11 RX ADMIN — LIDOCAINE HYDROCHLORIDE,EPINEPHRINE BITARTRATE 20 ML: 10; .01 INJECTION, SOLUTION INFILTRATION; PERINEURAL at 21:59

## 2023-07-11 ASSESSMENT — PAIN - FUNCTIONAL ASSESSMENT: PAIN_FUNCTIONAL_ASSESSMENT: 0-10

## 2023-07-12 NOTE — DISCHARGE INSTRUCTIONS
Keep the wound covered and dry for the first 24 hours. After 24 hours, you may remove the dressing and wash gently with soap and water, but do not immerse the wound in water for 3 days. Wash the wound gently and then pat dry twice per day. You should have your sutures removed in  7 days - call your doctor today to make an appointment for suture removal, or return to the ER if needed. Call your doctor or return to the ER for increasing redness (especially redness that streaks up an extremity), swelling, drainage of pus, fever, vomiting, or other concerns.

## 2023-07-12 NOTE — ED NOTES
Discharge instructions explained by ED provider. Patient verbalized understanding and denies any other concerns or complaints at this time. Patient vital signs stable and no acute signs or symptoms of distress noted at discharge. Patient deemed clinically stable. Patient d/c home.        Liseth Hightower RN  07/11/23 2339

## 2023-07-13 NOTE — ED PROVIDER NOTES
[address] [place] [address]  ED  EMERGENCY DEPARTMENT ENCOUNTER        Pt Name: Asha Mccoy  MRN: [id number]  [facility] 1950  Date of evaluation: 7/11/2023  Provider: Luis A Cano MD  PCP: Samira Coburn MD  Note Started: 9:09 AM EDT 7/13/23    CHIEF COMPLAINT       Chief Complaint   Patient presents with    Head Injury     Pt to ED d/t a head injury from falling in her garage and hitting her head on the concrete. Pt denies LOC and use of blood thinners. HISTORY OF PRESENT ILLNESS: 1 or more Elements     History from : Patient    Limitations to history : None    Asha Mccoy is a 68 y.o. female who presents with right-sided forehead injury after fall, hitting her head on the concrete. Denies focal weakness, nausea, vomiting, mild headache. Denies LOC, use of blood thinners. Denies neck pain, sensory loss. Symptoms not otherwise alleviated or exacerbated by other factors. Nursing Notes were all reviewed and agreed with or any disagreements were addressed in the HPI. REVIEW OF SYSTEMS :      Positives and Pertinent negatives as per HPI. ROS otherwise unremarkable.     SURGICAL HISTORY     Past Surgical History:   Procedure Laterality Date    ABDOMEN SURGERY  1995    benign tumor    COLONOSCOPY  12-    CSF SHUNT  1985    arnold chiari malformation - left ear    FOREARM SURGERY Right 2/24/2022    OPEN REDUCTION INTERNAL FIXATION RIGHT DISTAL RADIUS FRACTURE WITH BLOCK FOR PAIN CONTROL performed by Lisset Sotomayor MD at [address] [facility]       Discharge Medication List as of 7/11/2023 10:00 PM        CONTINUE these medications which have NOT CHANGED    Details   Bimatoprost (LUMIGAN OP) Apply to eye at bedtime Both eyesHistorical Med      dorzolamide-timolol (COSOPT) 22.3-6.8 MG/ML ophthalmic solution Place 1 drop into both eyes 2 times dailyHistorical Med      pravastatin (PRAVACHOL) 20 MG tablet Take 20 mg by mouth nightlyHistorical Med      alendronate (FOSAMAX)
[address]  [phone]    Schedule an appointment as soon as possible for a visit in 7 days      [facility]  ED  [place]  [phone]    If symptoms worsen, For suture removal, For wound re-check if unable to follow up with your primary care provider for suture removal      DISCHARGE MEDICATIONS:  Discharge Medication List as of 7/11/2023 10:00 PM          DISCONTINUED MEDICATIONS:  Discharge Medication List as of 7/11/2023 10:00 PM                 (Please note that portions ofthis note were completed with a voice recognition program.  Efforts were made to edit the dictations but occasionally words are mis-transcribed.)    JENA Sheikh CNP (electronically signed)       JENA Sheikh CNP  07/12/23 4161

## 2023-11-09 ENCOUNTER — HOSPITAL ENCOUNTER (OUTPATIENT)
Dept: WOMENS IMAGING | Age: 73
Discharge: HOME | End: 2023-11-09
Payer: MEDICARE

## 2023-11-09 DIAGNOSIS — Z12.31 ENCOUNTER FOR SCREENING MAMMOGRAM FOR BREAST CANCER: ICD-10-CM

## 2023-11-09 PROCEDURE — 77063 BREAST TOMOSYNTHESIS BI: CPT

## 2024-11-11 ENCOUNTER — HOSPITAL ENCOUNTER (OUTPATIENT)
Dept: WOMENS IMAGING | Age: 74
Discharge: HOME | End: 2024-11-11
Payer: MEDICARE

## 2024-11-11 VITALS — WEIGHT: 127 LBS | HEIGHT: 61 IN | BODY MASS INDEX: 23.98 KG/M2

## 2024-11-11 DIAGNOSIS — Z12.31 SCREENING MAMMOGRAM, ENCOUNTER FOR: ICD-10-CM

## 2024-11-11 PROCEDURE — 77063 BREAST TOMOSYNTHESIS BI: CPT

## (undated) DEVICE — GOWN,SIRUS,NON REINFRCD,LARGE,SET IN SL: Brand: MEDLINE

## (undated) DEVICE — DRAPE EQUIP C ARM MINI 10000100] TIDI PRODUCTS INC]

## (undated) DEVICE — Device

## (undated) DEVICE — SUTURE VCRL SZ 3-0 L18IN ABSRB UD L26MM SH 1/2 CIR J864D

## (undated) DEVICE — SUTURE ETHLN SZ 3-0 L18IN NONABSORBABLE BLK L24MM PS-1 3/8 1663G

## (undated) DEVICE — SET GRAV VENT NVENT CK VLV 3 NDL FREE PRT 10 GTT

## (undated) DEVICE — TROCAR TIP WIRE 1.4MM X 100MM
Type: IMPLANTABLE DEVICE | Site: RADIUS | Status: NON-FUNCTIONAL
Brand: D-RAD
Removed: 2022-02-24

## (undated) DEVICE — COMFO-TEX ELASTIC BANDAGE LATEX FREE, 3INX5YD: Brand: COMFO-TEX™

## (undated) DEVICE — Z DUP USE 2100943 SPLINT ORTH W3XL12IN WHT FBRGLS 1 SIDE FELT PD CNFRM LO

## (undated) DEVICE — SOLUTION IV 1000ML LAC RINGERS PH 6.5 INJ USP VIAFLX PLAS

## (undated) DEVICE — ZIMMER® STERILE DISPOSABLE TOURNIQUET CUFF WITH PLC, DUAL PORT, SINGLE BLADDER, 18 IN. (46 CM)

## (undated) DEVICE — SUTURE NONABSORBABLE MONOFILAMENT 4-0 PS-2 18 IN BLK ETHILON 1667G

## (undated) DEVICE — GLOVE,SURG,SENSICARE,ALOE,LF,PF,7: Brand: MEDLINE

## (undated) DEVICE — SET ADMIN PRIMING 7ML L30IN 7.35LB 20 GTT 2ND RLER CLMP

## (undated) DEVICE — ELECTRODE PT RET AD L9FT HI MOIST COND ADH HYDRGEL CORDED

## (undated) DEVICE — PADDING CAST W4INXL4YD NONSTERILE COT RAYON MICROPLEATED

## (undated) DEVICE — PERI-LOC K-WIRE 1.6MM X 150MM                                    LENGTH TROCAR POINT
Type: IMPLANTABLE DEVICE | Site: RADIUS | Status: NON-FUNCTIONAL
Brand: PERI-LOC
Removed: 2022-02-24

## (undated) DEVICE — CATHETER IV 20GA L1.25IN PNK FEP SFTY STR HUB RADPQ DISP

## (undated) DEVICE — 3M™ COBAN™ NL STERILE NON-LATEX SELF-ADHERENT WRAP, 2084S, 4 IN X 5 YD (10 CM X 4,5 M), 18 ROLLS/CASE: Brand: 3M™ COBAN™

## (undated) DEVICE — GLOVE SURG SZ 65 L12IN FNGR THK79MIL GRN LTX FREE

## (undated) DEVICE — SOLUTION IV IRRIG 500ML 0.9% SODIUM CHL 2F7123

## (undated) DEVICE — MEDI-VAC NON-CONDUCTIVE SUCTION TUBING: Brand: CARDINAL HEALTH

## (undated) DEVICE — GLOVE SURG SZ 65 L12IN FNGR THK94MIL STD WHT LTX FREE

## (undated) DEVICE — PAD CAST COTTON BLEND ST PKG 4INX4YD

## (undated) DEVICE — GLOVE SURG SZ 85 L12IN FNGR THK94MIL STD WHT LTX FREE

## (undated) DEVICE — EVOS DISTAL RADIUS 1.8MM DRILL AO QC: Brand: EVOS

## (undated) DEVICE — 10FR FRAZIER SUCTION HANDLE: Brand: CARDINAL HEALTH

## (undated) DEVICE — 3M™ TEGADERM™ TRANSPARENT FILM DRESSING FRAME STYLE, 1624W, 2-3/8 IN X 2-3/4 IN (6 CM X 7 CM), 100/CT 4CT/CASE: Brand: 3M™ TEGADERM™